# Patient Record
Sex: FEMALE | Race: BLACK OR AFRICAN AMERICAN | Employment: OTHER | URBAN - METROPOLITAN AREA
[De-identification: names, ages, dates, MRNs, and addresses within clinical notes are randomized per-mention and may not be internally consistent; named-entity substitution may affect disease eponyms.]

---

## 2017-01-19 ENCOUNTER — OP HISTORICAL/CONVERTED ENCOUNTER (OUTPATIENT)
Dept: OTHER | Age: 77
End: 2017-01-19

## 2017-02-02 ENCOUNTER — OP HISTORICAL/CONVERTED ENCOUNTER (OUTPATIENT)
Dept: OTHER | Age: 77
End: 2017-02-02

## 2017-02-06 ENCOUNTER — OP HISTORICAL/CONVERTED ENCOUNTER (OUTPATIENT)
Dept: OTHER | Age: 77
End: 2017-02-06

## 2017-03-15 RX ORDER — METHIMAZOLE 5 MG/1
TABLET ORAL
Qty: 69 TAB | Refills: 1 | Status: SHIPPED | OUTPATIENT
Start: 2017-03-15 | End: 2017-09-20 | Stop reason: SDUPTHER

## 2017-04-21 ENCOUNTER — OP HISTORICAL/CONVERTED ENCOUNTER (OUTPATIENT)
Dept: OTHER | Age: 77
End: 2017-04-21

## 2017-09-07 ENCOUNTER — TELEPHONE (OUTPATIENT)
Dept: ENDOCRINOLOGY | Age: 77
End: 2017-09-07

## 2017-09-07 DIAGNOSIS — E05.20 TOXIC MULTINODULAR GOITER: Primary | ICD-10-CM

## 2017-09-19 LAB
25(OH)D3+25(OH)D2 SERPL-MCNC: 28.5 NG/ML (ref 30–100)
T4 FREE SERPL-MCNC: 1 NG/DL (ref 0.82–1.77)
TSH SERPL DL<=0.005 MIU/L-ACNC: 1.32 UIU/ML (ref 0.45–4.5)

## 2017-09-20 RX ORDER — METHIMAZOLE 5 MG/1
TABLET ORAL
Qty: 69 TAB | Refills: 1 | Status: SHIPPED | OUTPATIENT
Start: 2017-09-20 | End: 2017-10-02 | Stop reason: SDUPTHER

## 2017-10-02 ENCOUNTER — OFFICE VISIT (OUTPATIENT)
Dept: ENDOCRINOLOGY | Age: 77
End: 2017-10-02

## 2017-10-02 VITALS
TEMPERATURE: 97.8 F | DIASTOLIC BLOOD PRESSURE: 74 MMHG | SYSTOLIC BLOOD PRESSURE: 124 MMHG | HEIGHT: 63 IN | BODY MASS INDEX: 32.25 KG/M2 | WEIGHT: 182 LBS | RESPIRATION RATE: 14 BRPM | HEART RATE: 68 BPM

## 2017-10-02 DIAGNOSIS — E04.2 MULTINODULAR NON-TOXIC GOITER: Primary | ICD-10-CM

## 2017-10-02 DIAGNOSIS — E05.90 SUBCLINICAL HYPERTHYROIDISM: ICD-10-CM

## 2017-10-02 DIAGNOSIS — M81.0 OSTEOPOROSIS, POST-MENOPAUSAL: ICD-10-CM

## 2017-10-02 RX ORDER — METHIMAZOLE 5 MG/1
TABLET ORAL
Qty: 69 TAB | Refills: 4 | Status: SHIPPED | OUTPATIENT
Start: 2017-10-02 | End: 2018-12-05 | Stop reason: SDUPTHER

## 2017-10-02 NOTE — PROGRESS NOTES
Wt Readings from Last 3 Encounters:   10/02/17 182 lb (82.6 kg)   06/28/16 188 lb (85.3 kg)   01/27/16 186 lb (84.4 kg)     Temp Readings from Last 3 Encounters:   10/02/17 97.8 °F (36.6 °C) (Oral)   06/28/16 97.2 °F (36.2 °C) (Oral)   01/27/16 96.8 °F (36 °C) (Oral)     BP Readings from Last 3 Encounters:   10/02/17 124/74   06/28/16 114/64   01/27/16 112/65     Pulse Readings from Last 3 Encounters:   10/02/17 68   06/28/16 68   01/27/16 66     Order placed for pt,  per Verbal Order with read back from Dr Thania Dominguez on 10/2/2017.

## 2017-10-02 NOTE — MR AVS SNAPSHOT
Visit Information Date & Time Provider Department Dept. Phone Encounter #  
 10/2/2017  9:30 AM Sussy Renner MD [facility] [phone] [id number] Follow-up Instructions Return in about 1 year (around 10/2/2018). Upcoming Health Maintenance Date Due DTaP/Tdap/Td series (1 - Tdap) 4/3/1963 ZOSTER VACCINE AGE 60> 2/3/2002 GLAUCOMA SCREENING Q2Y 4/3/2007 Pneumococcal 65+ Low/Medium Risk (1 of 2 - PCV13) 4/3/2007 MEDICARE YEARLY EXAM 4/3/2007 INFLUENZA AGE 9 TO ADULT 8/1/2017 Allergies as of 10/2/2017  Review Complete On: 10/2/2017 By: Kathleen Diana LPN Severity Noted Reaction Type Reactions Codeine  07/07/2010    Unknown (comments) Current Immunizations  Reviewed on 7/7/2010 No immunizations on file. Not reviewed this visit You Were Diagnosed With   
  
 Codes Comments Multinodular non-toxic goiter    -  Primary ICD-10-CM: E04.2 ICD-9-CM: 241.1 Osteoporosis, post-menopausal     ICD-10-CM: M81.0 ICD-9-CM: 733.01 Subclinical hyperthyroidism     ICD-10-CM: E05.90 ICD-9-CM: 242.90 Vitals BP Pulse Temp Resp Height(growth percentile) Weight(growth percentile) 124/74 (BP 1 Location: Left arm, BP Patient Position: Sitting) 68 97.8 °F (36.6 °C) (Oral) 14 5' 3\" (1.6 m) 182 lb (82.6 kg) BMI OB Status Smoking Status 32.24 kg/m2 Postmenopausal Never Smoker Vitals History BMI and BSA Data Body Mass Index Body Surface Area  
 32.24 kg/m 2 1.92 m 2 Preferred Pharmacy Pharmacy Name Phone [address] [facility] [phone] Your Updated Medication List  
  
   
This list is accurate as of: 10/2/17  9:44 AM.  Always use your most recent med list.  
  
  
  
  
 COZAAR 50 mg tablet Generic drug:  losartan Take 25 mg by mouth daily. methIMAzole 5 mg tablet Commonly known as:  TAPAZOLE  
 TAKE 1 TABLET ON ALTERNATE DAYS OF ONE-HALF (1/2) TABLET  
  
 PREMARIN 0.625 mg/gram vaginal cream  
Generic drug:  conjugated estrogens Insert 0.5 g into vagina daily. SINGULAIR 10 mg tablet Generic drug:  montelukast  
Take 10 mg by mouth daily. spironolactone 25 mg tablet Commonly known as:  ALDACTONE Take  by mouth daily. VITAMIN D3 2,000 unit Tab Generic drug:  cholecalciferol (vitamin D3) Take 1,000 Units by mouth daily. Prescriptions Sent to Pharmacy Refills  
 methIMAzole (TAPAZOLE) 5 mg tablet 4 Sig: TAKE 1 TABLET ON ALTERNATE DAYS OF ONE-HALF (1/2) TABLET Class: Normal  
 Pharmacy: [address], [address] [place] [address] #: [phone] Follow-up Instructions Return in about 1 year (around 10/2/2018). Patient Instructions   
 
 
 tapazole 5 mg and 2.5 mg alternately Start on Vit D  1200 it units a day Introducing [facility] SERVICES! [facility] introduces GVISP 1 patient portal. Now you can access parts of your medical record, email your doctor's office, and request medication refills online. 1. In your internet browser, go to [email or web address] 2. Click on the First Time User? Click Here link in the Sign In box. You will see the New Member Sign Up page. 3. Enter your GVISP 1 Access Code exactly as it appears below. You will not need to use this code after youve completed the sign-up process. If you do not sign up before the expiration date, you must request a new code. · GVISP 1 Access Code: [email or web address] Expires: 12/31/2017  9:44 AM 
 
4. Enter the last four digits of your Social Security Number (xxxx) and Date of Birth (mm/dd/yyyy) as indicated and click Submit. You will be taken to the next sign-up page. 5. Create a Infochimpst ID. This will be your Infochimpst login ID and cannot be changed, so think of one that is secure and easy to remember. 6. Create a Segmint password. You can change your password at any time. 7. Enter your Password Reset Question and Answer. This can be used at a later time if you forget your password. 8. Enter your e-mail address. You will receive e-mail notification when new information is available in [address] 9. Click Sign Up. You can now view and download portions of your medical record. 10. Click the Download Summary menu link to download a portable copy of your medical information. If you have questions, please visit the Frequently Asked Questions section of the Segmint website. Remember, Segmint is NOT to be used for urgent needs. For medical emergencies, dial 911. Now available from your iPhone and Android! Please provide this summary of care documentation to your next provider. Your primary care clinician is listed as [facility]. If you have any questions after today's visit, please call [phone].

## 2017-10-02 NOTE — PROGRESS NOTES
HISTORY OF PRESENT ILLNESS   Roxanne Conteh is a 78 y.o. female. 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 HENT: Negative. Eyes: Negative for pain and redness. Respiratory: Negative. Cardiovascular: Negative for chest pain, palpitations and leg swelling. Gastrointestinal: Negative. Negative for constipation. Genitourinary: Negative. Musculoskeletal: Negative for myalgias. Skin: Negative. Neurological: Negative. Endo/Heme/Allergies: Negative. Psychiatric/Behavioral: Negative for depression and memory loss. The patient does not have insomnia. Physical Exam   Constitutional: She is oriented to person, place, and time. She appears well-developed and well-nourished. HENT:   Head: Normocephalic. Eyes: Conjunctivae and extraocular motions are normal. Pupils are equal, round, and reactive to light. Neck: Normal range of motion. Neck supple. No JVD present. No tracheal deviation present. Thyromegaly (2-3 times enlarged nodular bilaterally) present. Cardiovascular: Normal rate, regular rhythm and normal heart sounds. No murmur heard. Pulmonary/Chest: Breath sounds normal.   Abdominal: Soft. Bowel sounds are normal.   Musculoskeletal: Normal range of motion. Lymphadenopathy:   She has no cervical adenopathy. Neurological: She is alert and oriented to person, place, and time. She has normal reflexes. Skin: Skin is warm. Psychiatric: She has a normal mood and affect. Lab Results   Component Value Date/Time    TSH 1.320 09/18/2017 08:46 AM    T4, Free 1.00 09/18/2017 08:46 AM          ASSESSMENT and PLAN       1.  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 TSH is 4.4 - pcp labs scanned from 4/14/2014    Started on  Tapazole  5 mg and 2.5 mg from April 2014   And she is euthyrodi now on this dose         2. Bilateral dominant nodules: 2.7 cm on right and on left side 1.6 cm from usg 4/29/2011 . Bilateral biopsies were done in dec 2011 and were negative for cancer     usg - oct 2013  Showed right , left and isthmus nodules to have shrunk in size   Had f/u usg thyroid jan 2016   Right thyroid lobe showed 4-5  Nodules ; Superior pole - hypoechoic,  Of 1.35 cm by 2.2 cm by 1.05 cm   At mid pole is 0.8 cm by 0.5 cm   At lower pole is 1.33 cm by 1.63 cm by 1.04 cm   A small one posterior to the inferior pole nodule    Left thyroid lobe showed 4 nodules of size 1.42 cm by 0.85 cm and another one of 1.4 cm   Isthmus has a nodule of 1.37 cm by 0.5 cm   Impression :  Many bilateral nodules, which have not grown in size much     She is asymptomatic       3.  Low BMD :   Date : Bone DEXA 3/24/2011 ap spine T-score -1.2; left femoral Neck T- score -1.0, right femoral neck T-score-1.5   On bisphonates oral fosamax for 11 years     She expressed concern for atypical fractures on fosamax   T 12 mild wedge deformity     Date : oct 2013   Bone DEXA  ap spine T-score -1.1; left femoral Neck T- score  -0.8, right femoral neck T-score -1.5  This one says that she did not have wedge fracture ( on hologic)  On premarin  Cream  for years since 2005  Date : dec 2015   Bone DEXA  ap spine T-score -1.7 ; left femoral Neck T- score  -1.1, right femoral neck T-score -1.2    She defers meds   She is aware of  estrogen adverse effects , but she is on it thru vaginal use    She is getting DEXa done in dec 2017 and will forward results to me   Discussed use of Bone markers   Stay on calcium and vit d            > 50 % of time is spent on counseling   Patient voiced understanding her plan of care

## 2018-04-24 ENCOUNTER — OP HISTORICAL/CONVERTED ENCOUNTER (OUTPATIENT)
Dept: OTHER | Age: 78
End: 2018-04-24

## 2018-09-25 LAB
25(OH)D3+25(OH)D2 SERPL-MCNC: 40.6 NG/ML (ref 30–100)
ALBUMIN SERPL-MCNC: 4.3 G/DL (ref 3.5–4.8)
ALBUMIN/GLOB SERPL: 1.5 {RATIO} (ref 1.2–2.2)
ALP SERPL-CCNC: 35 IU/L (ref 39–117)
ALT SERPL-CCNC: 16 IU/L (ref 0–32)
AST SERPL-CCNC: 17 IU/L (ref 0–40)
BILIRUB SERPL-MCNC: 0.4 MG/DL (ref 0–1.2)
BUN SERPL-MCNC: 20 MG/DL (ref 8–27)
BUN/CREAT SERPL: 22 (ref 12–28)
CALCIUM SERPL-MCNC: 9.8 MG/DL (ref 8.7–10.3)
CHLORIDE SERPL-SCNC: 101 MMOL/L (ref 96–106)
CO2 SERPL-SCNC: 25 MMOL/L (ref 20–29)
CREAT SERPL-MCNC: 0.89 MG/DL (ref 0.57–1)
GLOBULIN SER CALC-MCNC: 2.9 G/DL (ref 1.5–4.5)
GLUCOSE SERPL-MCNC: 102 MG/DL (ref 65–99)
POTASSIUM SERPL-SCNC: 4.4 MMOL/L (ref 3.5–5.2)
PROT SERPL-MCNC: 7.2 G/DL (ref 6–8.5)
SODIUM SERPL-SCNC: 140 MMOL/L (ref 134–144)
T4 FREE SERPL-MCNC: 1.1 NG/DL (ref 0.82–1.77)
TSH SERPL DL<=0.005 MIU/L-ACNC: 1.71 UIU/ML (ref 0.45–4.5)

## 2018-10-01 ENCOUNTER — OFFICE VISIT (OUTPATIENT)
Dept: ENDOCRINOLOGY | Age: 78
End: 2018-10-01

## 2018-10-01 VITALS
DIASTOLIC BLOOD PRESSURE: 76 MMHG | HEIGHT: 63 IN | SYSTOLIC BLOOD PRESSURE: 135 MMHG | WEIGHT: 179 LBS | TEMPERATURE: 97.5 F | OXYGEN SATURATION: 99 % | RESPIRATION RATE: 18 BRPM | HEART RATE: 73 BPM | BODY MASS INDEX: 31.71 KG/M2

## 2018-10-01 DIAGNOSIS — E05.90 HYPERTHYROIDISM: Chronic | ICD-10-CM

## 2018-10-01 DIAGNOSIS — E04.2 MULTINODULAR GOITER: Primary | ICD-10-CM

## 2018-10-01 NOTE — PROGRESS NOTES
HISTORY OF PRESENT ILLNESS Mark Anthony Duncan is a 70 y.o. female. HPI  
F/u for toxic MNG and osteoporosis after October 2017 Pt has pain in right side of the neck On occasions she feels dryness in the throat She wants to make sure thyroid is fine She denies any hyper or hypo thyrodi symptoms Old history Gained 2 lbs She is compliant with Tapazole 5 mg and 2.5 mg alternately Denies any hyper or hypothyroid symptoms Denies any growth of goiter either Has occasional swallowing issues She had labs outside No interim hospitalizations Review of Systems Constitutional: Negative. HENT: Negative. Eyes: Negative for pain and redness. Respiratory: Negative. Cardiovascular: Negative for chest pain, palpitations and leg swelling. Gastrointestinal: Negative. Negative for constipation. Genitourinary: Negative. Musculoskeletal: Negative for myalgias. Skin: Negative. Neurological: Negative. Endo/Heme/Allergies: Negative. Psychiatric/Behavioral: Negative for depression and memory loss. The patient does not have insomnia. Physical Exam  
Constitutional: She is oriented to person, place, and time. She appears well-developed and well-nourished. HENT:  
Head: Normocephalic. Eyes: Conjunctivae and extraocular motions are normal. Pupils are equal, round, and reactive to light. Neck: Normal range of motion. Neck supple. No JVD present. No tracheal deviation present. Thyromegaly (2-3 times enlarged nodular bilaterally) present. Cardiovascular: Normal rate, regular rhythm and normal heart sounds. No murmur heard. Pulmonary/Chest: Breath sounds normal.  
Abdominal: Soft. Bowel sounds are normal.  
Musculoskeletal: Normal range of motion. Lymphadenopathy:  
She has no cervical adenopathy. Neurological: She is alert and oriented to person, place, and time. She has normal reflexes. Skin: Skin is warm. Psychiatric: She has a normal mood and affect. Lab Results Component Value Date/Time TSH 1.710 09/24/2018 08:33 AM  
 T4, Free 1.10 09/24/2018 08:33 AM  
  
 
 
ASSESSMENT and PLAN 1. 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 TSH is 4.4 - pcp labs scanned from 4/14/2014 Started on  Tapazole  5 mg and 2.5 mg from April 2014 And she is euthyrodi now on this dose 2. Bilateral dominant nodules: 2.7 cm on right and on left side 1.6 cm from usg 4/29/2011 . Bilateral biopsies were done in dec 2011 and were negative for cancer  
 
usg - oct 2013  Showed right , left and isthmus nodules to have shrunk in size Had f/u usg thyroid jan 2016 Right thyroid lobe showed 4-5  Nodules ; Superior pole - hypoechoic,  Of 1.35 cm by 2.2 cm by 1.05 cm At mid pole is 0.8 cm by 0.5 cm At lower pole is 1.33 cm by 1.63 cm by 1.04 cm A small one posterior to the inferior pole nodule Left thyroid lobe showed 4 nodules of size 1.42 cm by 0.85 cm and another one of 1.4 cm Isthmus has a nodule of 1.37 cm by 0.5 cm Impression : 
Many bilateral nodules, which have not grown in size much 1.3 cm and 1.4 cm   on the ultrasound done in 2016 She is minimally symptomatic this visit October 1, 2018 and I have asked her to get an ultrasound done outside the office. She understands and is ready to get the old biopsy done if needed She does not like to take local anesthesia 3. Low BMD :  
Date : Bone DEXA 3/24/2011 ap spine T-score -1.2; left femoral Neck T- score -1.0, right femoral neck T-score-1.5 On bisphonates oral fosamax for 11 years She expressed concern for atypical fractures on fosamax T 12 mild wedge deformity Date : oct 2013   Bone DEXA  ap spine T-score -1.1; left femoral Neck T- score  -0.8, right femoral neck T-score -1.5 This one says that she did not have wedge fracture ( on hologic) On premarin  Cream  for years since 2005 Date : dec 2015   Bone DEXA  ap spine T-score -1.7 ; left femoral Neck T- score  -1.1, right femoral neck T-score -1.2 She defers meds She is aware of  estrogen adverse effects , but she is on it thru vaginal use Stay on calcium and vit d  
 
 
  
 
> 50 % of time is spent on counseling Patient voiced understanding her plan of care

## 2018-10-01 NOTE — PROGRESS NOTES
1. Have you been to the ER, urgent care clinic since your last visit? No  Hospitalized since your last visit? No 
 
2. Have you seen or consulted any other health care providers outside of the [address] [place] [address] since your last visit? No 
 
Wt Readings from Last 3 Encounters:  
10/01/18 179 lb (81.2 kg) 10/02/17 182 lb (82.6 kg) 06/28/16 188 lb (85.3 kg) Temp Readings from Last 3 Encounters:  
10/01/18 97.5 °F (36.4 °C) (Oral) 10/02/17 97.8 °F (36.6 °C) (Oral) 06/28/16 97.2 °F (36.2 °C) (Oral) BP Readings from Last 3 Encounters:  
10/01/18 135/76  
10/02/17 124/74  
06/28/16 114/64 Pulse Readings from Last 3 Encounters:  
10/01/18 73  
10/02/17 68  
06/28/16 68

## 2018-10-01 NOTE — MR AVS SNAPSHOT
[address] [facility] [address] 
[phone] Patient: Heriberto Bojorquez MRN: [id number] NASIM:[birth year] Visit Information Date & Time Provider Department Dept. Phone Encounter #  
 10/1/2018  9:00 AM Nicolasa Boles MD [facility] Endocr[facility]ology [phone] [id number] Follow-up Instructions Return in about 1 year (around 10/1/2019). Upcoming Health Maintenance Date Due DTaP/Tdap/Td series (1 - Tdap) 4/3/1963 Shingrix Vaccine Age 50> (1 of 2) 4/3/1992 GLAUCOMA SCREENING Q2Y 4/3/2007 Pneumococcal 65+ Low/Medium Risk (1 of 2 - PCV13) 4/3/2007 Influenza Age 5 to Adult 8/1/2018 Allergies as of 10/1/2018  Review Complete On: 10/1/2018 By: Nicolasa Boles MD  
  
 Severity Noted Reaction Type Reactions Codeine  07/07/2010    Unknown (comments) Current Immunizations  Reviewed on 7/7/2010 No immunizations on file. Not reviewed this visit You Were Diagnosed With   
  
 Codes Comments Multinodular goiter    -  Primary ICD-10-CM: E04.2 ICD-9-CM: 513. 1 Vitals BP Pulse Temp Resp Height(growth percentile) Weight(growth percentile) 135/76 (BP 1 Location: Right arm, BP Patient Position: Sitting) 73 97.5 °F (36.4 °C) (Oral) 18 5' 3\" (1.6 m) 179 lb (81.2 kg) SpO2 BMI OB Status Smoking Status 99% 31.71 kg/m2 Postmenopausal Never Smoker BMI and BSA Data Body Mass Index Body Surface Area 31.71 kg/m 2 1.9 m 2 Preferred Pharmacy Pharmacy Name Phone [facility] [phone] Your Updated Medication List  
  
   
This list is accurate as of 10/1/18  9:41 AM.  Always use your most recent med list.  
  
  
  
  
 CALCIUM 600 PO Take 600 mg by mouth two (2) times a day. COZAAR 50 mg tablet Generic drug:  losartan Take 25 mg by mouth daily. methIMAzole 5 mg tablet Commonly known as:  TAPAZOLE  
TAKE 1 TABLET ON ALTERNATE DAYS OF ONE-HALF (1/2) TABLET  
  
 PREMARIN 0.625 mg/gram vaginal cream  
Generic drug:  conjugated estrogens Insert 0.5 g into vagina daily. SINGULAIR 10 mg tablet Generic drug:  montelukast  
Take 10 mg by mouth daily. spironolactone 25 mg tablet Commonly known as:  ALDACTONE Take  by mouth daily. VITAMIN D3 2,000 unit Tab Generic drug:  cholecalciferol (vitamin D3) Take 1,000 Units by mouth daily. Follow-up Instructions Return in about 1 year (around 10/1/2019). To-Do List   
 10/01/2018 Imaging:  US THYROID/PARATHYROID/SOFT TISS Patient Instructions   
-------------------------------------------------------------------------------------------- Refills    -    please call your pharmacy and have them send us a refill request 
 
Results  -  allow up to a week for lab results to be processed and reviewed. Phone calls  -  Allow upto 24 hrs. for non-urgent calls to be retained Prior authorization - It may take up to 4 weeks to process, depending on your insurance Forms  -  FMLA, DMV, patient assistance, etc. will take up to 2 weeks to process Cancellations - please notify the office in advance if you cannot keep your appointment Samples  - will only be dispensed at visits as supply is limited If you are having a medical emergency call 911 
 
-------------------------------------------------------------------------------------------- 
 
 
 tapazole 5 mg and 2.5 mg alternately Stay on  Vit D  1200 it units a day Introducing [facility] SERVICES! [facility] introduces Kaleio patient portal. Now you can access parts of your medical record, email your doctor's office, and request medication refills online. 1. In your internet browser, go to [email or web address] 2. Click on the First Time User? Click Here link in the Sign In box.  You will see the New Member Sign Up page. 3. Enter your Interact Public Safety Access Code exactly as it appears below. You will not need to use this code after youve completed the sign-up process. If you do not sign up before the expiration date, you must request a new code. · Interact Public [employer] Access Code: [email or web address] Expires: 12/30/2018  9:36 AM 
 
4. Enter the last four digits of your Social Security Number (xxxx) and Date of Birth (mm/dd/yyyy) as indicated and click Submit. You will be taken to the next sign-up page. 5. Create a Interact Public Safety ID. This will be your Interact Public Safety login ID and cannot be changed, so think of one that is secure and easy to remember. 6. Create a Interact Public Safety password. You can change your password at any time. 7. Enter your Password Reset Question and Answer. This can be used at a later time if you forget your password. 8. Enter your e-mail address. You will receive e-mail notification when new information is available in [address] 9. Click Sign Up. You can now view and download portions of your medical record. 10. Click the Download Summary menu link to download a portable copy of your medical information. If you have questions, please visit the Frequently Asked Questions section of the Interact Public Safety website. Remember, Interact Public [employer] is NOT to be used for urgent needs. For medical emergencies, dial 911. Now available from your iPhone and Android! Please provide this summary of care documentation to your next provider. Your primary care clinician is listed as [facility]. If you have any questions after today's visit, please call [phone].

## 2018-10-07 ENCOUNTER — DOCUMENTATION ONLY (OUTPATIENT)
Dept: ENDOCRINOLOGY | Age: 78
End: 2018-10-07

## 2018-10-08 ENCOUNTER — OP HISTORICAL/CONVERTED ENCOUNTER (OUTPATIENT)
Dept: OTHER | Age: 78
End: 2018-10-08

## 2018-10-08 NOTE — PROGRESS NOTES
Reviewed the thyroid ultrasound from date October 3, 2018  Multiple mixed echoic thyroid nodules are noticed  Largest on the right thyroid lobe measuring 2.1 x 1.9 x 1.5 cm  On the on the left side the nodule is 1.3 x 0.9 x 1.1 cm  There is no increased internal vascularity      Inform patient that on my ultrasound which was done in the office I may have commented 2 separate nodules on the right side but they may have gotten measured together on the current ultrasound    I highly doubt that the nodules have gotten any bigger  I have not recommending a biopsy on her unless she has increasing symptoms and she wants to get it checked out I am open to do the biopsy in the office

## 2018-10-15 DIAGNOSIS — E04.2 MULTINODULAR GOITER: ICD-10-CM

## 2018-10-15 DIAGNOSIS — E05.90 HYPERTHYROIDISM: Chronic | ICD-10-CM

## 2018-12-05 DIAGNOSIS — M81.0 OSTEOPOROSIS, POST-MENOPAUSAL: ICD-10-CM

## 2018-12-05 DIAGNOSIS — E05.90 SUBCLINICAL HYPERTHYROIDISM: ICD-10-CM

## 2018-12-05 DIAGNOSIS — E04.2 MULTINODULAR NON-TOXIC GOITER: ICD-10-CM

## 2018-12-05 RX ORDER — METHIMAZOLE 5 MG/1
TABLET ORAL
Qty: 69 TAB | Refills: 4 | Status: SHIPPED | OUTPATIENT
Start: 2018-12-05 | End: 2019-10-28 | Stop reason: SDUPTHER

## 2019-04-29 ENCOUNTER — OP HISTORICAL/CONVERTED ENCOUNTER (OUTPATIENT)
Dept: OTHER | Age: 79
End: 2019-04-29

## 2019-10-25 ENCOUNTER — TELEPHONE (OUTPATIENT)
Dept: ENDOCRINOLOGY | Age: 79
End: 2019-10-25

## 2019-10-25 DIAGNOSIS — E04.2 MULTINODULAR NON-TOXIC GOITER: Primary | ICD-10-CM

## 2019-10-26 LAB
T4 FREE SERPL-MCNC: 1.1 NG/DL (ref 0.82–1.77)
TSH SERPL DL<=0.005 MIU/L-ACNC: 1.62 UIU/ML (ref 0.45–4.5)

## 2019-10-28 ENCOUNTER — OFFICE VISIT (OUTPATIENT)
Dept: ENDOCRINOLOGY | Age: 79
End: 2019-10-28

## 2019-10-28 VITALS
DIASTOLIC BLOOD PRESSURE: 71 MMHG | OXYGEN SATURATION: 96 % | HEIGHT: 63 IN | WEIGHT: 168.3 LBS | BODY MASS INDEX: 29.82 KG/M2 | TEMPERATURE: 97 F | RESPIRATION RATE: 18 BRPM | SYSTOLIC BLOOD PRESSURE: 119 MMHG | HEART RATE: 65 BPM

## 2019-10-28 DIAGNOSIS — E05.90 SUBCLINICAL HYPERTHYROIDISM: Primary | ICD-10-CM

## 2019-10-28 DIAGNOSIS — M81.0 OSTEOPOROSIS, POST-MENOPAUSAL: ICD-10-CM

## 2019-10-28 DIAGNOSIS — E04.2 MULTINODULAR NON-TOXIC GOITER: ICD-10-CM

## 2019-10-28 DIAGNOSIS — E05.90 SUBCLINICAL HYPERTHYROIDISM: ICD-10-CM

## 2019-10-28 RX ORDER — METHIMAZOLE 5 MG/1
TABLET ORAL
Qty: 69 TAB | Refills: 4 | Status: SHIPPED | OUTPATIENT
Start: 2019-10-28 | End: 2020-11-09 | Stop reason: SDUPTHER

## 2019-10-28 NOTE — PROGRESS NOTES
HISTORY OF PRESENT ILLNESS   Vincent Hsu is a 70 y.o. female. 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 after October 2018      Lost 9 lbs   She has been doing well           Old history     Pt has pain in right side of the neck  On occasions she feels dryness in the throat  She wants to make sure thyroid is fine   She denies any hyper or hypo thyrodi symptoms         Old history   Gained 2 lbs   She is compliant with Tapazole 5 mg and 2.5 mg alternately    Denies any hyper or hypothyroid symptoms   Denies any growth of goiter either    Has occasional swallowing issues    She had labs outside   No interim hospitalizations      Review of Systems   Constitutional: Negative. HENT: Negative. Eyes: Negative for pain and redness. Respiratory: Negative. Cardiovascular: Negative for chest pain, palpitations and leg swelling. Gastrointestinal: Negative. Negative for constipation. Genitourinary: Negative. Musculoskeletal: Negative for myalgias. Skin: Negative. Neurological: Negative. Endo/Heme/Allergies: Negative. Psychiatric/Behavioral: Negative for depression and memory loss. The patient does not have insomnia. Physical Exam   Constitutional: She is oriented to person, place, and time. She appears well-developed and well-nourished. HENT:   Head: Normocephalic. Eyes: Conjunctivae and extraocular motions are normal. Pupils are equal, round, and reactive to light. Neck: Normal range of motion. Neck supple. No JVD present. No tracheal deviation present. Thyromegaly (2-3 times enlarged nodular bilaterally) present. Cardiovascular: Normal rate, regular rhythm and normal heart sounds. No murmur heard. Pulmonary/Chest: Breath sounds normal.   Abdominal: Soft. Bowel sounds are normal.   Musculoskeletal: Normal range of motion. Lymphadenopathy:   She has no cervical adenopathy. Neurological: She is alert and oriented to person, place, and time. She has normal reflexes. Skin: Skin is warm. Psychiatric: She has a normal mood and affect. Lab Results   Component Value Date/Time    TSH 1.620 10/25/2019 11:32 AM    T4, Free 1.10 10/25/2019 11:32 AM          ASSESSMENT and PLAN       1. 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 TSH is 4.4 - pcp labs scanned from 4/14/2014    Started on  Tapazole  5 mg and 2.5 mg from April 2014   And she is euthyrodi now on this dose  Oct 2019 - doing well on the same dose          2. Bilateral dominant nodules: 2.7 cm on right and on left side 1.6 cm from usg 4/29/2011 . Bilateral biopsies were done in dec 2011 and were negative for cancer     usg - oct 2013  Showed right , left and isthmus nodules to have shrunk in size   Had f/u usg thyroid jan 2016   Right thyroid lobe showed 4-5  Nodules ; Superior pole - hypoechoic,  Of 1.35 cm by 2.2 cm by 1.05 cm   At mid pole is 0.8 cm by 0.5 cm   At lower pole is 1.33 cm by 1.63 cm by 1.04 cm   A small one posterior to the inferior pole nodule    Left thyroid lobe showed 4 nodules of size 1.42 cm by 0.85 cm and another one of 1.4 cm   Isthmus has a nodule of 1.37 cm by 0.5 cm   Impression :  Many bilateral nodules, which have not grown in size much 1.3 cm and 1.4 cm   on the ultrasound done in 2016    She is minimally symptomatic this visit October 1, 2018 and I have asked her to get an ultrasound done outside the office. She understands and is ready to get the old biopsy done if needed  She does not like to take local anesthesia      3.  Low BMD :   Date : Bone DEXA 3/24/2011 ap spine T-score -1.2; left femoral Neck T- score -1.0, right femoral neck T-score-1.5   On bisphonates oral fosamax for 11 years   AND STOPPED 5 YEARS AGO 2015     She expressed concern for atypical fractures on fosamax   T 12 mild wedge deformity     Date : oct 2013   Bone DEXA  ap spine T-score -1.1; left femoral Neck T- score  -0.8, right femoral neck T-score -1.5  This one says that she did not have wedge fracture ( on hologic)  On premarin  Cream  for years since 2005  Date : dec 2015   Bone DEXA  ap spine T-score -1.7 ; left femoral Neck T- score  -1.1, right femoral neck T-score -1.2    DUE FOR DEXA   2017 , 2019      She defers meds   She is aware of  estrogen adverse effects , but she is on it thru vaginal use  Stay on calcium and vit d            > 50 % of time is spent on counseling   Patient voiced understanding her plan of care

## 2019-10-28 NOTE — PATIENT INSTRUCTIONS
-------------------------------------------------------------------------------------------- Refills    -    please call your pharmacy and have them send us a refill request 
 
Results  -  allow up to a week for lab results to be processed and reviewed. Phone calls  -  Allow upto 24 hrs. for non-urgent calls to be retained Prior authorization - It may take up to 4 weeks to process, depending on your insurance Forms  -  FMLA, DMV, patient assistance, etc. will take up to 2 weeks to process Cancellations - please notify the office in advance if you cannot keep your appointment Samples  - will only be dispensed at visits as supply is limited If you are having a medical emergency call 911 
 
-------------------------------------------------------------------------------------------- 
 
 
 tapazole 5 mg and 2.5 mg alternately Stay on  Vit D  1200 it units a day CALCIUM 600 MG BID

## 2019-10-28 NOTE — PROGRESS NOTES
Room 3    Identified pt with two pt identifiers(name and ). Reviewed record in preparation for visit and have obtained necessary documentation. All patient medications has been reviewed. Chief Complaint   Patient presents with    Thyroid Problem    Osteoporosis       Health Maintenance Due   Topic    DTaP/Tdap/Td series (1 - Tdap)    Shingrix Vaccine Age 49> (1 of 2)    GLAUCOMA SCREENING Q2Y     Pneumococcal 65+ years (1 of 2 - PCV13)    Influenza Age 5 to Adult        Vitals:    10/28/19 1132   BP: 119/71   Pulse: 65   Resp: 18   Temp: 97 °F (36.1 °C)   TempSrc: Oral   SpO2: 96%   Weight: 168 lb 4.8 oz (76.3 kg)   Height: 5' 3\" (1.6 m)   PainSc:   0 - No pain       Wt Readings from Last 3 Encounters:   10/28/19 168 lb 4.8 oz (76.3 kg)   10/01/18 179 lb (81.2 kg)   10/02/17 182 lb (82.6 kg)     Temp Readings from Last 3 Encounters:   10/28/19 97 °F (36.1 °C) (Oral)   10/01/18 97.5 °F (36.4 °C) (Oral)   10/02/17 97.8 °F (36.6 °C) (Oral)     BP Readings from Last 3 Encounters:   10/28/19 119/71   10/01/18 135/76   10/02/17 124/74     Pulse Readings from Last 3 Encounters:   10/28/19 65   10/01/18 73   10/02/17 68       No results found for: HBA1C, HGBE8, WOX6OYLT, IHH0HGUR, GUL6CHJQ    Coordination of Care Questionnaire:   1) Have you been to an emergency room, urgent care, or hospitalized since your last visit?   no       2. Have seen or consulted any other health care provider since your last visit? NO    3) Do you have an Advanced Directive/ Living Will in place? NO  If yes, do we have a copy on file NO  If no, would you like information NO    Patient is accompanied by self I have received verbal consent from Beti Jc to discuss any/all medical information while they are present in the room.

## 2019-12-10 ENCOUNTER — DOCUMENTATION ONLY (OUTPATIENT)
Dept: ENDOCRINOLOGY | Age: 79
End: 2019-12-10

## 2019-12-10 NOTE — PROGRESS NOTES
REVIEWED dexa  FROM   DEC 4 2019     Date : DEC 4 2019   Bone DEXA  ap spine T-score 0.1; left femoral Neck T- score -0.9, right femoral neck T-score -1.5    FRAX SCORE  : MAJOR FRACTURE IS 5.5 %  AND HIP IS 1.2 %        Inform pt that bone quality is good     Deniz Moreno MD

## 2019-12-23 DIAGNOSIS — E04.2 MULTINODULAR NON-TOXIC GOITER: ICD-10-CM

## 2019-12-23 DIAGNOSIS — E05.90 SUBCLINICAL HYPERTHYROIDISM: ICD-10-CM

## 2019-12-23 DIAGNOSIS — M81.0 OSTEOPOROSIS, POST-MENOPAUSAL: ICD-10-CM

## 2020-01-07 ENCOUNTER — HOSPITAL ENCOUNTER (OUTPATIENT)
Dept: CT IMAGING | Age: 80
Discharge: HOME | End: 2020-01-07
Attending: OPHTHALMOLOGY
Payer: MEDICARE

## 2020-01-07 DIAGNOSIS — H05.242 CONSTANT EXOPHTHALMOS, LEFT EYE: ICD-10-CM

## 2020-01-07 PROCEDURE — 74011636320 HC RX REV CODE- 636/320: Performed by: RADIOLOGY

## 2020-01-07 PROCEDURE — 70481 CT ORBIT/EAR/FOSSA W/DYE: CPT

## 2020-01-07 RX ADMIN — IOPAMIDOL 100 ML: 612 INJECTION, SOLUTION INTRAVENOUS at 07:51

## 2020-01-21 LAB — PAP SMEAR, EXTERNAL: NORMAL

## 2020-08-18 ENCOUNTER — OP HISTORICAL/CONVERTED ENCOUNTER (OUTPATIENT)
Dept: OTHER | Age: 80
End: 2020-08-18

## 2020-10-14 LAB
ALBUMIN SERPL-MCNC: 4.4 G/DL (ref 3.7–4.7)
ALBUMIN/GLOB SERPL: 1.3 {RATIO} (ref 1.2–2.2)
ALP SERPL-CCNC: 43 IU/L (ref 39–117)
ALT SERPL-CCNC: 14 IU/L (ref 0–32)
AST SERPL-CCNC: 16 IU/L (ref 0–40)
BASOPHILS # BLD AUTO: 0 X10E3/UL (ref 0–0.2)
BASOPHILS NFR BLD AUTO: 1 %
BILIRUB SERPL-MCNC: 0.6 MG/DL (ref 0–1.2)
BUN SERPL-MCNC: 14 MG/DL (ref 8–27)
BUN/CREAT SERPL: 17 (ref 12–28)
CALCIUM SERPL-MCNC: 9.8 MG/DL (ref 8.7–10.3)
CHLORIDE SERPL-SCNC: 103 MMOL/L (ref 96–106)
CO2 SERPL-SCNC: 26 MMOL/L (ref 20–29)
CREAT SERPL-MCNC: 0.83 MG/DL (ref 0.57–1)
EOSINOPHIL # BLD AUTO: 0.1 X10E3/UL (ref 0–0.4)
EOSINOPHIL NFR BLD AUTO: 1 %
ERYTHROCYTE [DISTWIDTH] IN BLOOD BY AUTOMATED COUNT: 13.2 % (ref 11.7–15.4)
GLOBULIN SER CALC-MCNC: 3.3 G/DL (ref 1.5–4.5)
GLUCOSE SERPL-MCNC: 91 MG/DL (ref 65–99)
HCT VFR BLD AUTO: 44 % (ref 34–46.6)
HGB BLD-MCNC: 14.5 G/DL (ref 11.1–15.9)
IMM GRANULOCYTES # BLD AUTO: 0 X10E3/UL (ref 0–0.1)
IMM GRANULOCYTES NFR BLD AUTO: 0 %
LYMPHOCYTES # BLD AUTO: 2.4 X10E3/UL (ref 0.7–3.1)
LYMPHOCYTES NFR BLD AUTO: 45 %
MCH RBC QN AUTO: 30.3 PG (ref 26.6–33)
MCHC RBC AUTO-ENTMCNC: 33 G/DL (ref 31.5–35.7)
MCV RBC AUTO: 92 FL (ref 79–97)
MONOCYTES # BLD AUTO: 0.4 X10E3/UL (ref 0.1–0.9)
MONOCYTES NFR BLD AUTO: 7 %
NEUTROPHILS # BLD AUTO: 2.5 X10E3/UL (ref 1.4–7)
NEUTROPHILS NFR BLD AUTO: 46 %
PLATELET # BLD AUTO: 236 X10E3/UL (ref 150–450)
POTASSIUM SERPL-SCNC: 4.3 MMOL/L (ref 3.5–5.2)
PROT SERPL-MCNC: 7.7 G/DL (ref 6–8.5)
RBC # BLD AUTO: 4.79 X10E6/UL (ref 3.77–5.28)
SODIUM SERPL-SCNC: 141 MMOL/L (ref 134–144)
T4 FREE SERPL-MCNC: 1.06 NG/DL (ref 0.82–1.77)
TSH SERPL DL<=0.005 MIU/L-ACNC: 3.53 UIU/ML (ref 0.45–4.5)
WBC # BLD AUTO: 5.4 X10E3/UL (ref 3.4–10.8)

## 2020-10-26 ENCOUNTER — OFFICE VISIT (OUTPATIENT)
Dept: ENDOCRINOLOGY | Age: 80
End: 2020-10-26
Payer: MEDICARE

## 2020-10-26 VITALS
WEIGHT: 170.8 LBS | HEIGHT: 63 IN | TEMPERATURE: 97.8 F | BODY MASS INDEX: 30.26 KG/M2 | RESPIRATION RATE: 18 BRPM | HEART RATE: 72 BPM | OXYGEN SATURATION: 98 % | DIASTOLIC BLOOD PRESSURE: 53 MMHG | SYSTOLIC BLOOD PRESSURE: 120 MMHG

## 2020-10-26 DIAGNOSIS — E05.20 TOXIC NODULAR GOITER: Primary | ICD-10-CM

## 2020-10-26 DIAGNOSIS — E05.90 SUBCLINICAL HYPERTHYROIDISM: ICD-10-CM

## 2020-10-26 DIAGNOSIS — E04.2 MULTINODULAR NON-TOXIC GOITER: ICD-10-CM

## 2020-10-26 PROCEDURE — 99214 OFFICE O/P EST MOD 30 MIN: CPT | Performed by: INTERNAL MEDICINE

## 2020-10-26 PROCEDURE — 1090F PRES/ABSN URINE INCON ASSESS: CPT | Performed by: INTERNAL MEDICINE

## 2020-10-26 PROCEDURE — G8427 DOCREV CUR MEDS BY ELIG CLIN: HCPCS | Performed by: INTERNAL MEDICINE

## 2020-10-26 PROCEDURE — 1101F PT FALLS ASSESS-DOCD LE1/YR: CPT | Performed by: INTERNAL MEDICINE

## 2020-10-26 PROCEDURE — G8752 SYS BP LESS 140: HCPCS | Performed by: INTERNAL MEDICINE

## 2020-10-26 PROCEDURE — G8754 DIAS BP LESS 90: HCPCS | Performed by: INTERNAL MEDICINE

## 2020-10-26 PROCEDURE — G8536 NO DOC ELDER MAL SCRN: HCPCS | Performed by: INTERNAL MEDICINE

## 2020-10-26 PROCEDURE — G8510 SCR DEP NEG, NO PLAN REQD: HCPCS | Performed by: INTERNAL MEDICINE

## 2020-10-26 PROCEDURE — G8417 CALC BMI ABV UP PARAM F/U: HCPCS | Performed by: INTERNAL MEDICINE

## 2020-10-26 NOTE — PROGRESS NOTES
1. Have you been to the ER, urgent care clinic since your last visit?no  Hospitalized since your last visit? No    2. Have you seen or consulted any other health care providers outside of the [address] [place] [address] since your last visit? Include any pap smears or colon screening.  No    Wt Readings from Last 3 Encounters:   10/26/20 170 lb 12.8 oz (77.5 kg)   10/28/19 168 lb 4.8 oz (76.3 kg)   10/01/18 179 lb (81.2 kg)     Temp Readings from Last 3 Encounters:   10/26/20 97.8 °F (36.6 °C) (Oral)   10/28/19 97 °F (36.1 °C) (Oral)   10/01/18 97.5 °F (36.4 °C) (Oral)     BP Readings from Last 3 Encounters:   10/26/20 (!) 120/53   10/28/19 119/71   10/01/18 135/76     Pulse Readings from Last 3 Encounters:   10/26/20 72   10/28/19 65   10/01/18 73

## 2020-10-26 NOTE — PROGRESS NOTES
HISTORY OF PRESENT ILLNESS   Daniella Lisa is a 68 y.o. female. 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 after October 2019      Gained 2  lbs   She has been doing well   No compressive symptoms           Old history     Pt has pain in right side of the neck  On occasions she feels dryness in the throat  She wants to make sure thyroid is fine   She denies any hyper or hypo thyrodi symptoms         Old history   Gained 2 lbs   She is compliant with Tapazole 5 mg and 2.5 mg alternately    Denies any hyper or hypothyroid symptoms   Denies any growth of goiter either    Has occasional swallowing issues    She had labs outside   No interim hospitalizations      Review of Systems   Constitutional: Negative. HENT: Negative. Eyes: Negative for pain and redness. Respiratory: Negative. Cardiovascular: Negative for chest pain, palpitations and leg swelling. Gastrointestinal: Negative. Negative for constipation. Genitourinary: Negative. Musculoskeletal: Negative for myalgias. Skin: Negative. Neurological: Negative. Endo/Heme/Allergies: Negative. Psychiatric/Behavioral: Negative for depression and memory loss. The patient does not have insomnia. Physical Exam   Constitutional: She is oriented to person, place, and time. She appears well-developed and well-nourished. HENT:   Head: Normocephalic. Eyes: Conjunctivae and extraocular motions are normal. Pupils are equal, round, and reactive to light. Neck: Normal range of motion. Neck supple. No JVD present. No tracheal deviation present. Thyromegaly (2-3 times enlarged nodular bilaterally) present. Cardiovascular: Normal rate, regular rhythm and normal heart sounds. No murmur heard. Pulmonary/Chest: Breath sounds normal.   Abdominal: Soft. Bowel sounds are normal.   Musculoskeletal: Normal range of motion. Lymphadenopathy:   She has no cervical adenopathy. Neurological: She is alert and oriented to person, place, and time. She has normal reflexes. Skin: Skin is warm. Psychiatric: She has a normal mood and affect. Lab Results   Component Value Date/Time    TSH 3.530 10/13/2020 02:33 PM    T4, Free 1.06 10/13/2020 02:33 PM          ASSESSMENT and PLAN       1. 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 TSH is 4.4 - pcp labs scanned from 4/14/2014    Started on  Tapazole  5 mg and 2.5 mg from April 2014   And she is euthyrodi now on this dose  Oct 2019 - doing well on the same dose       Oct 2020  -  TAPAZole   5 mg and 2.5   Alternate days, and this dose for   6  Years          2. Bilateral dominant nodules: 2.7 cm on right and on left side 1.6 cm from usg 4/29/2011 . Bilateral biopsies were done in dec 2011 and were negative for cancer     usg - oct 2013  Showed right , left and isthmus nodules to have shrunk in size   Had f/u usg thyroid jan 2016   Right thyroid lobe showed 4-5  Nodules ; Superior pole - hypoechoic,  Of 1.35 cm by 2.2 cm by 1.05 cm   At mid pole is 0.8 cm by 0.5 cm   At lower pole is 1.33 cm by 1.63 cm by 1.04 cm   A small one posterior to the inferior pole nodule    Left thyroid lobe showed 4 nodules of size 1.42 cm by 0.85 cm and another one of 1.4 cm   Isthmus has a nodule of 1.37 cm by 0.5 cm   Impression :  Many bilateral nodules, which have not grown in size much 1.3 cm and 1.4 cm   on the ultrasound done in 2016    She is minimally symptomatic this visit October 1, 2018 and I have asked her to get an ultrasound done outside the office. She understands and is ready to get the old biopsy done if needed  She does not like to take local anesthesia      Oct 2020 :  [address] 2018  : left side 1.3 cm  And right side 2.1 cm ( noticing slight growth of nodule on right side )   Ordered [facility]g for next year at [facility]  And will follow up on that            3.  Low BMD :   Date : Bone DEXA 3/24/2011 ap spine T-score -1.2; left femoral Neck T- score -1.0, right femoral neck T-score-1.5   On bisphonates oral fosamax for 11 years   AND STOPPED 5 YEARS AGO 2015     She expressed concern for atypical fractures on fosamax   T 12 mild wedge deformity     Date : oct 2013   Bone DEXA  ap spine T-score -1.1; left femoral Neck T- score  -0.8, right femoral neck T-score -1.5  This one says that she did not have wedge fracture ( on hologic)  On premarin  Cream  for years since 2005  Date : dec 2015   Bone DEXA  ap spine T-score -1.7 ; left femoral Neck T- score  -1.1, right femoral neck T-score -1.2    DUE FOR DEXA   2017 ,  2019      She defers meds   She is aware of  estrogen adverse effects , but she is on it thru vaginal use  Stay on calcium and vit d            > 50 % of time is spent on counseling   Patient voiced understanding her plan of care

## 2020-10-26 NOTE — LETTER
10/26/20 Patient: Amelia Arriaga YOB: 1940 Date of Visit: 10/26/2020 Sergei Funez MD 
[address] [facility] [address] VIA In Basket Dear Sergei Funez MD, Thank you for referring Ms. Emily Hayward to [address] [address] for evaluation. My notes for this consultation are attached. If you have questions, please do not hesitate to call me. I look forward to following your patient along with you. Sincerely, Ruben Rain MD

## 2020-11-09 DIAGNOSIS — E05.90 SUBCLINICAL HYPERTHYROIDISM: ICD-10-CM

## 2020-11-09 DIAGNOSIS — E04.2 MULTINODULAR NON-TOXIC GOITER: ICD-10-CM

## 2020-11-09 DIAGNOSIS — M81.0 OSTEOPOROSIS, POST-MENOPAUSAL: ICD-10-CM

## 2020-11-09 RX ORDER — METHIMAZOLE 5 MG/1
TABLET ORAL
Qty: 75 TAB | Refills: 3 | Status: SHIPPED | OUTPATIENT
Start: 2020-11-09 | End: 2021-10-25

## 2020-11-25 VITALS
BODY MASS INDEX: 30.12 KG/M2 | DIASTOLIC BLOOD PRESSURE: 74 MMHG | HEIGHT: 63 IN | SYSTOLIC BLOOD PRESSURE: 116 MMHG | WEIGHT: 170 LBS

## 2021-09-14 ENCOUNTER — TRANSCRIBE ORDER (OUTPATIENT)
Dept: SCHEDULING | Age: 81
End: 2021-09-14

## 2021-09-14 DIAGNOSIS — Z12.31 VISIT FOR SCREENING MAMMOGRAM: Primary | ICD-10-CM

## 2021-09-28 ENCOUNTER — TRANSCRIBE ORDER (OUTPATIENT)
Dept: SCHEDULING | Age: 81
End: 2021-09-28

## 2021-09-28 ENCOUNTER — HOSPITAL ENCOUNTER (OUTPATIENT)
Dept: MAMMOGRAPHY | Age: 81
Discharge: HOME | End: 2021-09-28
Attending: INTERNAL MEDICINE
Payer: MEDICARE

## 2021-09-28 DIAGNOSIS — Z12.31 VISIT FOR SCREENING MAMMOGRAM: ICD-10-CM

## 2021-09-28 DIAGNOSIS — Z78.0 POSTMENOPAUSAL: Primary | ICD-10-CM

## 2021-09-28 PROCEDURE — 77063 BREAST TOMOSYNTHESIS BI: CPT

## 2021-10-24 DIAGNOSIS — M81.0 OSTEOPOROSIS, POST-MENOPAUSAL: ICD-10-CM

## 2021-10-24 DIAGNOSIS — E05.90 SUBCLINICAL HYPERTHYROIDISM: ICD-10-CM

## 2021-10-24 DIAGNOSIS — E04.2 MULTINODULAR NON-TOXIC GOITER: ICD-10-CM

## 2021-10-25 RX ORDER — METHIMAZOLE 5 MG/1
TABLET ORAL
Qty: 68 TABLET | Refills: 3 | Status: SHIPPED | OUTPATIENT
Start: 2021-10-25 | End: 2022-10-27 | Stop reason: SDUPTHER

## 2021-10-27 ENCOUNTER — OFFICE VISIT (OUTPATIENT)
Dept: ENDOCRINOLOGY | Age: 81
End: 2021-10-27
Payer: MEDICARE

## 2021-10-27 VITALS
WEIGHT: 172.8 LBS | RESPIRATION RATE: 18 BRPM | BODY MASS INDEX: 30.62 KG/M2 | TEMPERATURE: 97.3 F | SYSTOLIC BLOOD PRESSURE: 116 MMHG | HEIGHT: 63 IN | DIASTOLIC BLOOD PRESSURE: 63 MMHG | OXYGEN SATURATION: 98 % | HEART RATE: 52 BPM

## 2021-10-27 DIAGNOSIS — E05.20 TOXIC NODULAR GOITER: Primary | ICD-10-CM

## 2021-10-27 DIAGNOSIS — M81.0 OSTEOPOROSIS, POST-MENOPAUSAL: ICD-10-CM

## 2021-10-27 DIAGNOSIS — E05.90 SUBCLINICAL HYPERTHYROIDISM: ICD-10-CM

## 2021-10-27 PROCEDURE — G8427 DOCREV CUR MEDS BY ELIG CLIN: HCPCS | Performed by: INTERNAL MEDICINE

## 2021-10-27 PROCEDURE — 1090F PRES/ABSN URINE INCON ASSESS: CPT | Performed by: INTERNAL MEDICINE

## 2021-10-27 PROCEDURE — G8752 SYS BP LESS 140: HCPCS | Performed by: INTERNAL MEDICINE

## 2021-10-27 PROCEDURE — G8510 SCR DEP NEG, NO PLAN REQD: HCPCS | Performed by: INTERNAL MEDICINE

## 2021-10-27 PROCEDURE — G8754 DIAS BP LESS 90: HCPCS | Performed by: INTERNAL MEDICINE

## 2021-10-27 PROCEDURE — 1101F PT FALLS ASSESS-DOCD LE1/YR: CPT | Performed by: INTERNAL MEDICINE

## 2021-10-27 PROCEDURE — G8417 CALC BMI ABV UP PARAM F/U: HCPCS | Performed by: INTERNAL MEDICINE

## 2021-10-27 PROCEDURE — 99214 OFFICE O/P EST MOD 30 MIN: CPT | Performed by: INTERNAL MEDICINE

## 2021-10-27 PROCEDURE — G8536 NO DOC ELDER MAL SCRN: HCPCS | Performed by: INTERNAL MEDICINE

## 2021-10-27 NOTE — PATIENT INSTRUCTIONS
SPECIFIC INSTRUCTIONS BELOW        tapazole 5 mg and 2.5 mg alternately       Stay on  Vit D  1200 it units a day   CALCIUM 600 MG BID       -------------PAY ATTENTION TO THESE GENERAL INSTRUCTIONS -----------------      - The medications prescribed at this visit will not be available at pharmacy until 6 pm       - YOUR MED LIST IS NOT UP TO DATE AS SOME CHANGES ARE BEING MADE AFTER THE VISIT - FOLLOW SPECIFIC INSTRUCTIONS  ABOVE     -ANY tests other than blood work, which you opt to do  outside the  [facility] facilities, you are responsible for prior authorizations if  required    - [phone] [facility]- PLEASE IGNORE     Results     *Normal results will not be notified by a phone call starting January 1 2021   *If you have an upcoming visit, the results will be discussed at the visit   *Please sign up for MY CHART if you want access to your lab and test results  *Abnormal results which require immediate attention will be notified by phone call   *Abnormal results which do not require immediate assistance will be notified in 1-2 weeks       Refills    -    have your pharmacy send us a refill request . Refills are done max for one year and a visit is a must before refills are extended    Follow up appointments -  highly encourage you to make it when you are checking out. We can accommodate you into the schedule based on your clinical situation, but not for extending refills beyond a year. Labs are important to give refills and is important to get labs before the visit     Phone calls  -  Allow  24 hrs.  for non-urgent calls to be returned  Prior authorization - It may take 2-4 weeks to process  Forms  -  FMLA, DMV etc., will take up to 2 weeks to process  Cancellations - please notify the office 2 days in advance   Samples  - will only be dispensed at visits       If not showing for the appointments and cancelling appointments within 24 hours are kept track of and three  of such situations in  two consecutive years will likely be considered for termination from the practice    -------------------------------------------------------------------------------------------------------------------

## 2021-10-27 NOTE — LETTER
10/28/2021    Patient: Tello Fuentes   YOB: 1940   Date of Visit: 10/27/2021     Dom Sanches MD  [facility] [address]  Via Fax: [phone]    Dear Dom Sanches MD,      Thank you for referring Ms. Aniyah Walter to [address] [place] [address] for evaluation. My notes for this consultation are attached. If you have questions, please do not hesitate to call me. I look forward to following your patient along with you.       Sincerely,    Jaren Dumont MD

## 2021-10-27 NOTE — PROGRESS NOTES
HISTORY OF PRESENT ILLNESS       Laura Rowe is a 80  y.o. female. 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 after October 2020      Gained 2 lbs  Compliant with tapazole       october 2020     Gained 2  lbs   She has been doing well   No compressive symptoms     Old history   Gained 2 lbs   She is compliant with Tapazole 5 mg and 2.5 mg alternately    Denies any hyper or hypothyroid symptoms   Denies any growth of goiter eitherHas occasional swallowing issues    She had labs outside   No interim hospitalizations      Review of Systems   Constitutional: Negative. Psychiatric/Behavioral: Negative for depression and memory loss. The patient does not have insomnia. Physical Exam   Constitutional: She is oriented to person, place, and time. She appears well-developed and well-nourished. Neck: Normal range of motion. Neck supple. No JVD present. No tracheal deviation present. Thyromegaly (2-3 times enlarged nodular bilaterally) present. Psychiatric: She has a normal mood and affect. Lab Results   Component Value Date/Time    TSH 3.140 10/19/2021 01:26 PM    T4, Free 1.06 10/19/2021 01:26 PM          ASSESSMENT and PLAN       1. 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 TSH is 4.4 - pcp labs scanned from 4/14/2014    Started on  Tapazole  5 mg and 2.5 mg from April 2014   And she is euthyrodi now on this dose  Oct 2019 - doing well on the same dose     Oct 2020  -  TAPAZole   5 mg and 2.5   Alternate days, and this dose for   6  Years   Oct 2021 -  Euthyroid on this TAPAZOLE of 5 mg  And  2.5 mg  Alternate days for 7 years          2. Bilateral dominant nodules: 2.7 cm on right and on left side 1.6 cm from usg 4/29/2011 .    Bilateral biopsies were done in dec 2011 and were negative for cancer   Deleted notes   Oct 2020 :  APPOMATOX  IMAGING  OCT 2018  : left side 1.3 cm  And right side 2.1 cm ( noticing slight growth of nodule on right side )   Oct 2021 : appomatox  Imaging  :   Largest nodule  On right side  -  1.8 cm by 1.6 cm by 0.8 cm      3.  Low BMD :   Date : Bone DEXA 3/24/2011 ap spine T-score -1.2; left femoral Neck T- score -1.0, right femoral neck T-score-1.5   On bisphonates oral fosamax for 11 years   AND STOPPED 5 YEARS AGO 2015     She expressed concern for atypical fractures on fosamax   T 12 mild wedge deformity     Date : oct 2013   Bone DEXA  ap spine T-score -1.1; left femoral Neck T- score  -0.8, right femoral neck T-score -1.5  This one says that she did not have wedge fracture ( on hologic)  On premarin  Cream  for years since 2005  Date : dec 2015   Bone DEXA  ap spine T-score -1.7 ; left femoral Neck T- score  -1.1, right femoral neck T-score -1.2    She had dexas   2017 ,  2019  , 2021  - need to obtain     She defers meds   She is aware of  estrogen adverse effects , but she is on it thru vaginal use  Stay on calcium and vit d            Reviewed results with patient and discussed the labs being ordered today/bnv  Patient voiced understanding of plan of care

## 2022-01-05 ENCOUNTER — DOCUMENTATION ONLY (OUTPATIENT)
Dept: ENDOCRINOLOGY | Age: 82
End: 2022-01-05

## 2022-01-06 NOTE — PROGRESS NOTES
Date : dec 21 2021   Bone DEXA from [facility]   ap spine T-score  0 ; left femoral Neck T- score  n/a, right femoral neck T-score -1.4    Tania Narayan MD

## 2022-03-11 ENCOUNTER — TELEPHONE (OUTPATIENT)
Dept: OBGYN CLINIC | Age: 82
End: 2022-03-11

## 2022-03-11 DIAGNOSIS — Z46.89 ENCOUNTER FOR PESSARY MAINTENANCE: Primary | ICD-10-CM

## 2022-03-11 RX ORDER — ESTRADIOL 0.1 MG/G
CREAM VAGINAL
Qty: 30 G | Refills: 1 | Status: SHIPPED | OUTPATIENT
Start: 2022-03-11 | End: 2022-05-04 | Stop reason: SDUPTHER

## 2022-03-11 NOTE — TELEPHONE ENCOUNTER
Patient called requesting a refill on Estradiol vaginal cream.  She is scheduled for her annual exam on 05/04/22. Refill submitted.

## 2022-05-04 ENCOUNTER — OFFICE VISIT (OUTPATIENT)
Dept: OBGYN CLINIC | Age: 82
End: 2022-05-04
Payer: MEDICARE

## 2022-05-04 VITALS
RESPIRATION RATE: 16 BRPM | DIASTOLIC BLOOD PRESSURE: 66 MMHG | OXYGEN SATURATION: 98 % | SYSTOLIC BLOOD PRESSURE: 114 MMHG | WEIGHT: 174 LBS | HEIGHT: 63 IN | BODY MASS INDEX: 30.83 KG/M2 | HEART RATE: 71 BPM

## 2022-05-04 DIAGNOSIS — Z46.89 ENCOUNTER FOR PESSARY MAINTENANCE: ICD-10-CM

## 2022-05-04 DIAGNOSIS — Z90.710 SCREENING FOR MALIGNANT NEOPLASM OF VAGINA AFTER TOTAL HYSTERECTOMY: ICD-10-CM

## 2022-05-04 DIAGNOSIS — Z12.72 SCREENING FOR MALIGNANT NEOPLASM OF VAGINA AFTER TOTAL HYSTERECTOMY: ICD-10-CM

## 2022-05-04 DIAGNOSIS — Z01.419 GYNECOLOGIC EXAM NORMAL: Primary | ICD-10-CM

## 2022-05-04 PROCEDURE — G0101 CA SCREEN;PELVIC/BREAST EXAM: HCPCS | Performed by: OBSTETRICS & GYNECOLOGY

## 2022-05-04 PROCEDURE — G8417 CALC BMI ABV UP PARAM F/U: HCPCS | Performed by: OBSTETRICS & GYNECOLOGY

## 2022-05-04 PROCEDURE — G8536 NO DOC ELDER MAL SCRN: HCPCS | Performed by: OBSTETRICS & GYNECOLOGY

## 2022-05-04 PROCEDURE — G8427 DOCREV CUR MEDS BY ELIG CLIN: HCPCS | Performed by: OBSTETRICS & GYNECOLOGY

## 2022-05-04 PROCEDURE — G8510 SCR DEP NEG, NO PLAN REQD: HCPCS | Performed by: OBSTETRICS & GYNECOLOGY

## 2022-05-04 RX ORDER — ESTRADIOL 0.1 MG/G
CREAM VAGINAL
Qty: 30 G | Refills: 1 | Status: SHIPPED | OUTPATIENT
Start: 2022-05-04

## 2022-05-04 RX ORDER — ASCORBIC ACID 500 MG
1000 TABLET ORAL DAILY
COMMUNITY

## 2022-05-04 NOTE — PROGRESS NOTES
Kenton Wall is a [de-identified] y.o. female, , No LMP recorded. Patient is postmenopausal., who presents today for the following:  Chief Complaint   Patient presents with    Annual Exam        Allergies   Allergen Reactions    Latex, Natural Rubber Rash    Codeine Unknown (comments)    Peanut Hives and Itching     All nuts       Current Outpatient Medications   Medication Sig    ascorbic acid, vitamin C, (Vitamin C) 500 mg tablet Take 1,000 mg by mouth daily.  estradioL (ESTRACE) 0.01 % (0.1 mg/gram) vaginal cream Insert 1 gm daily by vaginal route    methIMAzole (TAPAZOLE) 5 mg tablet ALTERNATE 1 TABLET WITH 1/2TABLET DAILY    calcium carbonate (CALCIUM 600 PO) Take 600 mg by mouth two (2) times a day.  cholecalciferol, vitamin D3, (VITAMIN D3) 2,000 unit tab Take 2,000 Units by mouth daily.  spironolactone (ALDACTONE) 25 mg tablet Take  by mouth daily.  montelukast (SINGULAIR) 10 mg tablet Take 10 mg by mouth daily. No current facility-administered medications for this visit.        Past Medical History:   Diagnosis Date    Arthritis     Asthma     Hypertension     Hyperthyroidism     Osteoporosis     Thyroid disease     hyperthyroidism       Past Surgical History:   Procedure Laterality Date    HX BREAST BIOPSY      HX HYSTERECTOMY      HX OTHER SURGICAL  2019    right hand trigger release    HX REFRACTIVE SURGERY         Family History   Problem Relation Age of Onset    Cancer Other     Hypertension Other     Diabetes Father     Hypertension Father     Heart Disease Father     Hypertension Sister     Cancer Sister         colon/rectum    Cancer Brother     Alzheimer's Disease Paternal Aunt        Social History     Socioeconomic History    Marital status:      Spouse name: Not on file    Number of children: Not on file    Years of education: Not on file    Highest education level: Not on file   Occupational History    Not on file   Tobacco Use    Smoking status: Never Smoker    Smokeless tobacco: Never Used   Substance and Sexual Activity    Alcohol use: Yes     Comment: occas    Drug use: No    Sexual activity: Not Currently     Birth control/protection: Surgical   Other Topics Concern    Not on file   Social History Narrative    Not on file     Social Determinants of Health     Financial Resource Strain:     Difficulty of Paying Living Expenses: Not on file   Food Insecurity:     Worried About Running Out of Food in the Last Year: Not on file    Dev of Food in the Last Year: Not on file   Transportation Needs:     Lack of Transportation (Medical): Not on file    Lack of Transportation (Non-Medical): Not on file   Physical Activity:     Days of Exercise per Week: Not on file    Minutes of Exercise per Session: Not on file   Stress:     Feeling of Stress : Not on file   Social Connections:     Frequency of Communication with Friends and Family: Not on file    Frequency of Social Gatherings with Friends and Family: Not on file    Attends Jainism Services: Not on file    Active Member of [address] [place] [address] Platial or Organizations: Not on file    Attends Club or Organization Meetings: Not on file    Marital Status: Not on file   Intimate Partner Violence:     Fear of Current or Ex-Partner: Not on file    Emotionally Abused: Not on file    Physically Abused: Not on file    Sexually Abused: Not on file   Housing Stability:     Unable to Pay for Housing in the Last Year: Not on file    Number of [place] in the Last Year: Not on file    Unstable Housing in the Last Year: Not on file         HPI  Annual    Review of Systems   Constitutional: Negative. Respiratory: Negative. Cardiovascular: Negative. Gastrointestinal: Negative. Genitourinary: Negative. Musculoskeletal: Negative. Skin: Negative. Neurological: Negative. Endo/Heme/Allergies: Negative. Psychiatric/Behavioral: Negative. All other systems reviewed and are negative. /66 (BP 1 Location: Left upper arm, BP Patient Position: Sitting)   Pulse 71   Resp 16   Ht 5' 3\" (1.6 m)   Wt 174 lb (78.9 kg)   SpO2 98%   BMI 30.82 kg/m²    OBGyn Exam   Constitutional:     General Appearance: healthy-appearing, well-nourished, and well-developed; Level of Distress: NAD. Ambulation: ambulating normally. Psychiatric:   Insight: good judgement. Mental Status: normal mood and affect and active and alert. Orientation: to time, place, and person. Memory: recent memory normal and remote memory normal.     Head: Head: normocephalic and atraumatic. Neck:   Neck: supple, FROM, trachea midline, and no masses. Lymph Nodes: no cervical LAD, supraclavicular LAD, axillary LAD, or inguinal LAD. Thyroid: no enlargement or nodules and non-tender. Lungs:   Respiratory effort: no dyspnea. Cardiovascular:     Pulses including femoral / pedal: normal throughout. Breast: Breast: no masses or abnormal secretions and normal appearance. Abdomen:    no tenderness, guarding, masses, rebound tenderness, or CVA tenderness and non-distended. Female :   External genitalia: no lesions or rash and normal.   Vagina: moist mucosa; prolapse  Adnexae: no adnexal mass or tenderness and size WNL. Bladder and Urethra: normal bladder and urethra (except where noted). Skin:   Inspection and palpation: multiple nevi          1. Gynecologic exam normal    - PAP IG, RFX APTIMA HPV ASCUS ([id number])    2.  Screening for malignant neoplasm of vagina after total hysterectomy

## 2022-05-04 NOTE — PATIENT INSTRUCTIONS

## 2022-05-07 LAB
CYTOLOGIST CVX/VAG CYTO: NORMAL
CYTOLOGY CVX/VAG DOC CYTO: NORMAL
CYTOLOGY CVX/VAG DOC THIN PREP: NORMAL
DX ICD CODE: NORMAL
LABCORP, 190119: NORMAL
Lab: NORMAL
OTHER STN SPEC: NORMAL
STAT OF ADQ CVX/VAG CYTO-IMP: NORMAL

## 2022-09-29 ENCOUNTER — TRANSCRIBE ORDER (OUTPATIENT)
Dept: SCHEDULING | Age: 82
End: 2022-09-29

## 2022-09-29 DIAGNOSIS — Z12.31 SCREENING MAMMOGRAM FOR HIGH-RISK PATIENT: Primary | ICD-10-CM

## 2022-09-30 ENCOUNTER — HOSPITAL ENCOUNTER (OUTPATIENT)
Dept: MAMMOGRAPHY | Age: 82
Discharge: HOME | End: 2022-09-30
Payer: MEDICARE

## 2022-09-30 DIAGNOSIS — Z12.31 SCREENING MAMMOGRAM FOR HIGH-RISK PATIENT: ICD-10-CM

## 2022-09-30 PROCEDURE — 77063 BREAST TOMOSYNTHESIS BI: CPT

## 2022-10-26 LAB
BASOPHILS # BLD AUTO: 0 X10E3/UL (ref 0–0.2)
BASOPHILS NFR BLD AUTO: 0 %
EOSINOPHIL # BLD AUTO: 0.2 X10E3/UL (ref 0–0.4)
EOSINOPHIL NFR BLD AUTO: 2 %
ERYTHROCYTE [DISTWIDTH] IN BLOOD BY AUTOMATED COUNT: 12.6 % (ref 11.7–15.4)
HCT VFR BLD AUTO: 35.4 % (ref 34–46.6)
HGB BLD-MCNC: 12.2 G/DL (ref 11.1–15.9)
IMM GRANULOCYTES # BLD AUTO: 0 X10E3/UL (ref 0–0.1)
IMM GRANULOCYTES NFR BLD AUTO: 0 %
LYMPHOCYTES # BLD AUTO: 2.3 X10E3/UL (ref 0.7–3.1)
LYMPHOCYTES NFR BLD AUTO: 25 %
MCH RBC QN AUTO: 31.4 PG (ref 26.6–33)
MCHC RBC AUTO-ENTMCNC: 34.5 G/DL (ref 31.5–35.7)
MCV RBC AUTO: 91 FL (ref 79–97)
MONOCYTES # BLD AUTO: 0.8 X10E3/UL (ref 0.1–0.9)
MONOCYTES NFR BLD AUTO: 9 %
NEUTROPHILS # BLD AUTO: 5.6 X10E3/UL (ref 1.4–7)
NEUTROPHILS NFR BLD AUTO: 64 %
PLATELET # BLD AUTO: 311 X10E3/UL (ref 150–450)
RBC # BLD AUTO: 3.89 X10E6/UL (ref 3.77–5.28)
T4 FREE SERPL-MCNC: 1.07 NG/DL (ref 0.82–1.77)
TSH SERPL DL<=0.005 MIU/L-ACNC: 1.49 UIU/ML (ref 0.45–4.5)
WBC # BLD AUTO: 9 X10E3/UL (ref 3.4–10.8)

## 2022-10-27 ENCOUNTER — OFFICE VISIT (OUTPATIENT)
Dept: ENDOCRINOLOGY | Age: 82
End: 2022-10-27
Payer: MEDICARE

## 2022-10-27 ENCOUNTER — TELEPHONE (OUTPATIENT)
Dept: ENDOCRINOLOGY | Age: 82
End: 2022-10-27

## 2022-10-27 VITALS
DIASTOLIC BLOOD PRESSURE: 63 MMHG | HEART RATE: 78 BPM | BODY MASS INDEX: 29.95 KG/M2 | RESPIRATION RATE: 18 BRPM | SYSTOLIC BLOOD PRESSURE: 121 MMHG | TEMPERATURE: 97.2 F | OXYGEN SATURATION: 97 % | WEIGHT: 169 LBS | HEIGHT: 63 IN

## 2022-10-27 DIAGNOSIS — E05.90 SUBCLINICAL HYPERTHYROIDISM: ICD-10-CM

## 2022-10-27 DIAGNOSIS — M81.0 OSTEOPOROSIS, POST-MENOPAUSAL: ICD-10-CM

## 2022-10-27 DIAGNOSIS — E04.2 MULTINODULAR NON-TOXIC GOITER: ICD-10-CM

## 2022-10-27 DIAGNOSIS — E05.20 TOXIC NODULAR GOITER: Primary | ICD-10-CM

## 2022-10-27 PROCEDURE — G8417 CALC BMI ABV UP PARAM F/U: HCPCS | Performed by: INTERNAL MEDICINE

## 2022-10-27 PROCEDURE — G8752 SYS BP LESS 140: HCPCS | Performed by: INTERNAL MEDICINE

## 2022-10-27 PROCEDURE — G8754 DIAS BP LESS 90: HCPCS | Performed by: INTERNAL MEDICINE

## 2022-10-27 PROCEDURE — 3074F SYST BP LT 130 MM HG: CPT | Performed by: INTERNAL MEDICINE

## 2022-10-27 PROCEDURE — G8432 DEP SCR NOT DOC, RNG: HCPCS | Performed by: INTERNAL MEDICINE

## 2022-10-27 PROCEDURE — 3078F DIAST BP <80 MM HG: CPT | Performed by: INTERNAL MEDICINE

## 2022-10-27 PROCEDURE — G8427 DOCREV CUR MEDS BY ELIG CLIN: HCPCS | Performed by: INTERNAL MEDICINE

## 2022-10-27 PROCEDURE — G8536 NO DOC ELDER MAL SCRN: HCPCS | Performed by: INTERNAL MEDICINE

## 2022-10-27 PROCEDURE — 1123F ACP DISCUSS/DSCN MKR DOCD: CPT | Performed by: INTERNAL MEDICINE

## 2022-10-27 PROCEDURE — 1090F PRES/ABSN URINE INCON ASSESS: CPT | Performed by: INTERNAL MEDICINE

## 2022-10-27 PROCEDURE — 1101F PT FALLS ASSESS-DOCD LE1/YR: CPT | Performed by: INTERNAL MEDICINE

## 2022-10-27 PROCEDURE — 99214 OFFICE O/P EST MOD 30 MIN: CPT | Performed by: INTERNAL MEDICINE

## 2022-10-27 RX ORDER — METHIMAZOLE 5 MG/1
TABLET ORAL
Qty: 68 TABLET | Refills: 3 | Status: SHIPPED | OUTPATIENT
Start: 2022-10-27

## 2022-10-27 NOTE — LETTER
10/27/2022    Patient: Laurie Burn   YOB: 1940   Date of Visit: 10/27/2022     Shayla Barcenas NP  [address] [place] [address]  [address]  [place] [address]  Via Fax: [phone]    Dear Shayla Barcenas NP,      Thank you for referring Ms. Lino Arevalo to [address] [place] [address] for evaluation. My notes for this consultation are attached. If you have questions, please do not hesitate to call me. I look forward to following your patient along with you.       Sincerely,    Jazlyn Vargas MD

## 2022-10-27 NOTE — TELEPHONE ENCOUNTER
Called patient to advise her of Dr Romana Pam comments. Unable to leave voicemail message for return call as voicemail box is full.

## 2022-10-27 NOTE — TELEPHONE ENCOUNTER
Patient is requesting an order for a dexa scan to be mailed to her so she can have it sone at [facility] prior to next visit.

## 2022-10-27 NOTE — PATIENT INSTRUCTIONS
SPECIFIC INSTRUCTIONS BELOW        tapazole 5 mg and 2.5 mg alternately       Stay on  Vit D  1200 it units a day   CALCIUM 600 MG BID       -------------PAY ATTENTION TO THESE GENERAL INSTRUCTIONS -----------------      - The medications prescribed at this visit will not be available at pharmacy until 6 pm       - YOUR MED LIST IS NOT UP TO DATE AS SOME CHANGES ARE BEING MADE AFTER THE VISIT - FOLLOW SPECIFIC INSTRUCTIONS  ABOVE     -ANY tests other than blood work, which you opt to do  outside the  [facility] facilities, you are responsible for prior authorizations if  required    - [address] [facility]- PLEASE IGNORE     Results     *Normal results will not be notified by a phone call starting January 1 2021   *If you have an upcoming visit, the results will be discussed at the visit   *Please sign up for MY CHART if you want access to your lab and test results  *Abnormal results which require immediate attention will be notified by phone call   *Abnormal results which do not require immediate assistance will be notified in 1-2 weeks       Refills    -    have your pharmacy send us a refill request . Refills are done max for one year and a visit is a must before refills are extended    Follow up appointments -  highly encourage you to make it when you are checking out. We can accommodate you into the schedule based on your clinical situation, but not for extending refills beyond a year. Labs are important to give refills and is important to get labs before the visit     Phone calls  -  Allow  24 hrs.  for non-urgent calls to be returned  Prior authorization - It may take 2-4 weeks to process  Forms  -  FMLA, DMV etc., will take up to 2 weeks to process  Cancellations - please notify the office 2 days in advance   Samples  - will only be dispensed at visits       If not showing for the appointments and cancelling appointments within 24 hours are kept track of and three  of such situations in  two consecutive years will likely be considered for termination from the practice    -------------------------------------------------------------------------------------------------------------------

## 2022-10-27 NOTE — PROGRESS NOTES
HISTORY OF PRESENT ILLNESS       Davey Mayen is a [de-identified] y.o. female. 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 Psychiatric/Behavioral: Negative for depression and memory loss. The patient does not have insomnia. Physical Exam   Constitutional: She is oriented to person, place, and time. She appears well-developed and well-nourished. Neck: Normal range of motion. Neck supple. No JVD present. No tracheal deviation present. Thyromegaly (2-3 times enlarged nodular bilaterally) present. Psychiatric: She has a normal mood and affect. Lab Results   Component Value Date/Time    TSH 1.490 10/25/2022 10:32 AM    T4, Free 1.07 10/25/2022 10:32 AM          ASSESSMENT and PLAN       1. 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 TSH is 4.4 - pcp labs scanned from 4/14/2014    Started on  Tapazole  5 mg and 2.5 mg from April 2014   Oct 2022 -  Euthyroid on this TAPAZOLE of 5 mg  And  2.5 mg  Alternate days for 8  years          2. Bilateral dominant nodules: 2.7 cm on right and on left side 1.6 cm from usg 4/29/2011 .    Bilateral biopsies were done in dec 2011 and were negative for cancer   Deleted notes   Oct 2020 :  APPOMATOX  IMAGING  OCT 2018  : left side 1.3 cm  And right side 2.1 cm ( noticing slight growth of nodule on right side )   Oct 2021 : [facility]  Imaging  :   Largest nodule  On right side  -  1.8 cm by 1.6 cm by 0.8 cm      October 2022 : will do usg thyroid in 2023, but will be done with dexa due in dec 2023, so ordering it next visit           3.  Low BMD :   Date : Bone DEXA 3/24/2011 ap spine T-score -1.2; left femoral Neck T- score -1.0, right femoral neck T-score-1.5   On bisphonates oral fosamax for 11 years   AND STOPPED 5 YEARS AGO 2015     She expressed concern for atypical fractures on fosamax   T 12 mild wedge deformity     Date : oct 2013   Bone DEXA  ap spine T-score -1.1; left femoral Neck T- score  -0.8, right femoral neck T-score -1.5  This one says that she did not have wedge fracture ( on hologic)  On premarin  Cream  for years since 2005  Date : dec 2015   Bone DEXA  ap spine T-score -1.7 ; left femoral Neck T- score  -1.1, right femoral neck T-score -1.2    She had dexas   2017 ,  2019  , 2021      Date : dec 21 2021   Bone DEXA from [facility]   ap spine T-score  0 ; left femoral Neck T- score  n/a, right femoral neck T-score -1.4      She defers meds , due for  dexa in July 2023  Stay on calcium and vit d            Due for dexa  and usg together in dec 2023    Reviewed results with patient and discussed the labs being ordered today/bnv  Patient voiced understanding of plan of care

## 2022-10-27 NOTE — TELEPHONE ENCOUNTER
She will NOT be due for it then - she is due in dec 2023   I will do at next visit with me in a year     Princess Daniella MD

## 2023-05-18 RX ORDER — MONTELUKAST SODIUM 10 MG/1
10 TABLET ORAL DAILY
COMMUNITY

## 2023-05-18 RX ORDER — METHIMAZOLE 5 MG/1
TABLET ORAL
COMMUNITY
Start: 2022-10-27

## 2023-05-18 RX ORDER — SPIRONOLACTONE 25 MG/1
25 TABLET ORAL DAILY
COMMUNITY

## 2023-05-18 RX ORDER — ASCORBIC ACID 500 MG
1000 TABLET ORAL DAILY
COMMUNITY

## 2023-05-18 RX ORDER — ESTRADIOL 0.1 MG/G
CREAM VAGINAL
COMMUNITY
Start: 2022-05-04

## 2023-08-31 ENCOUNTER — TELEPHONE (OUTPATIENT)
Age: 83
End: 2023-08-31

## 2023-08-31 NOTE — TELEPHONE ENCOUNTER
1st patient is on the mediblue medicare but has ppo I have shared to call insurance to discuss out of network benefits. She understands and will let us know but is hoping to continue here as she really likes Dr Roseann Mcdowell.     In mean time she does have some recent concern for her nodule on the left side has become pretty sore/sensitive when lying down and would like to know if a pain reliever such as tylenol can help or should she be seen ? She had discussed a bad reaction to Rolaids after having a bad acid reflux flare up so unclear if she is associating the recent change in throat area of nodule to that.      Advised may be tomorrow before hearing back she understood

## 2023-09-06 NOTE — TELEPHONE ENCOUNTER
I spoke with patient, she is feeling much better. Did not take any pain medication. She did drink warm water and used ice pack. She is currently taking Omeprazole so she does not need to take Prilosec she says.    She will advise office if any further pain and will keep her appt in October 2024

## 2023-10-10 RX ORDER — METHIMAZOLE 5 MG/1
TABLET ORAL
Qty: 68 TABLET | Refills: 3 | Status: SHIPPED | OUTPATIENT
Start: 2023-10-10

## 2023-10-25 LAB
ALBUMIN SERPL-MCNC: 4.3 G/DL (ref 3.7–4.7)
ALBUMIN/GLOB SERPL: 1.3 {RATIO} (ref 1.2–2.2)
ALP SERPL-CCNC: 54 IU/L (ref 44–121)
ALT SERPL-CCNC: 12 IU/L (ref 0–32)
AST SERPL-CCNC: 14 IU/L (ref 0–40)
BILIRUB SERPL-MCNC: 0.3 MG/DL (ref 0–1.2)
BUN SERPL-MCNC: 14 MG/DL (ref 8–27)
BUN/CREAT SERPL: 16 (ref 12–28)
CALCIUM SERPL-MCNC: 10 MG/DL (ref 8.7–10.3)
CHLORIDE SERPL-SCNC: 104 MMOL/L (ref 96–106)
CO2 SERPL-SCNC: 24 MMOL/L (ref 20–29)
CREAT SERPL-MCNC: 0.9 MG/DL (ref 0.57–1)
EGFRCR SERPLBLD CKD-EPI 2021: 64 ML/MIN/1.73
GLOBULIN SER CALC-MCNC: 3.2 G/DL (ref 1.5–4.5)
GLUCOSE SERPL-MCNC: 96 MG/DL (ref 70–99)
POTASSIUM SERPL-SCNC: 4.3 MMOL/L (ref 3.5–5.2)
PROT SERPL-MCNC: 7.5 G/DL (ref 6–8.5)
SODIUM SERPL-SCNC: 143 MMOL/L (ref 134–144)
T4 FREE SERPL-MCNC: 1.04 NG/DL (ref 0.82–1.77)
TSH SERPL DL<=0.005 MIU/L-ACNC: 2.79 UIU/ML (ref 0.45–4.5)

## 2023-10-30 ENCOUNTER — OFFICE VISIT (OUTPATIENT)
Age: 83
End: 2023-10-30
Payer: COMMERCIAL

## 2023-10-30 VITALS
RESPIRATION RATE: 20 BRPM | DIASTOLIC BLOOD PRESSURE: 79 MMHG | BODY MASS INDEX: 28.67 KG/M2 | WEIGHT: 161.8 LBS | HEIGHT: 63 IN | HEART RATE: 76 BPM | OXYGEN SATURATION: 97 % | SYSTOLIC BLOOD PRESSURE: 139 MMHG

## 2023-10-30 DIAGNOSIS — E05.20 THYROTOXICOSIS WITH TOXIC MULTINODULAR GOITER WITHOUT THYROTOXIC CRISIS OR STORM: Primary | ICD-10-CM

## 2023-10-30 DIAGNOSIS — M81.0 AGE-RELATED OSTEOPOROSIS WITHOUT CURRENT PATHOLOGICAL FRACTURE: ICD-10-CM

## 2023-10-30 PROCEDURE — 3078F DIAST BP <80 MM HG: CPT | Performed by: INTERNAL MEDICINE

## 2023-10-30 PROCEDURE — 3075F SYST BP GE 130 - 139MM HG: CPT | Performed by: INTERNAL MEDICINE

## 2023-10-30 PROCEDURE — 1123F ACP DISCUSS/DSCN MKR DOCD: CPT | Performed by: INTERNAL MEDICINE

## 2023-10-30 PROCEDURE — 99214 OFFICE O/P EST MOD 30 MIN: CPT | Performed by: INTERNAL MEDICINE

## 2023-10-30 RX ORDER — METHIMAZOLE 5 MG/1
TABLET ORAL
Qty: 68 TABLET | Refills: 3 | Status: SHIPPED | OUTPATIENT
Start: 2023-10-30

## 2023-10-30 RX ORDER — OMEPRAZOLE 20 MG/1
CAPSULE, DELAYED RELEASE ORAL
COMMUNITY
Start: 2023-09-01

## 2023-10-30 RX ORDER — METHIMAZOLE 5 MG/1
TABLET ORAL
Qty: 68 TABLET | Refills: 3 | Status: SHIPPED | OUTPATIENT
Start: 2023-10-30 | End: 2023-10-30 | Stop reason: SDUPTHER

## 2023-10-30 NOTE — PROGRESS NOTES
Blood pressure 139/79, pulse 76, resp. rate 20, height 1.6 m (5' 3\"), weight 73.4 kg (161 lb 12.8 oz), SpO2 97 %.
right side )   Oct 2021 : [facility]  Imaging  :   Largest nodule  On right side  -  1.8 cm by 1.6 cm by 0.8 cm      October 2023  : will do usg thyroid in 2023, but will be done with dexa due in dec 2023, ordered           3.  Low BMD :   Date : Bone DEXA 3/24/2011 ap spine T-score -1.2; left femoral Neck T- score -1.0, right femoral neck T-score-1.5   On bisphonates oral fosamax for 11 years   AND STOPPED 5 YEARS AGO 2015     She expressed concern for atypical fractures on fosamax   T 12 mild wedge deformity     Date : oct 2013   Bone DEXA  ap spine T-score -1.1; left femoral Neck T- score  -0.8, right femoral neck T-score -1.5  This one says that she did not have wedge fracture ( on hologic)  On premarin  Cream  for years since 2005  Date : dec 2015   Bone DEXA  ap spine T-score -1.7 ; left femoral Neck T- score  -1.1, right femoral neck T-score -1.2    She had dexas   2017 ,  2019  , 2021      Date : dec 21 2021   Bone DEXA from [facility]   ap spine T-score  0 ; left femoral Neck T- score  n/a, right femoral neck T-score -1.4    Due for DEXA in  Dec 2023     She defers meds , due for  dexa in July 2023  Stay on calcium and vit d         Due for dexa  and usg together in dec 2023 - ordered     I am planning to see her after tests to discuss them with her     Reviewed results with patient and discussed the labs being ordered today/bnv  Patient voiced understanding of plan of care

## 2023-10-30 NOTE — PATIENT INSTRUCTIONS
SPECIFIC INSTRUCTIONS BELOW     TAPAZOLE of 5 mg  And  2.5 mg        -------------PAY ATTENTION TO THESE GENERAL INSTRUCTIONS -----------------      - The medications prescribed at this visit will not be available at pharmacy until 6 pm       - YOUR MED LIST IS NOT UP TO DATE AS SOME CHANGES ARE BEING MADE AFTER THE VISIT - FOLLOW SPECIFIC INSTRUCTIONS  ABOVE     -ANY tests other than blood work, which you opt to do  outside the  [facility] imaging facilities, you are responsible for prior authorizations if  required    - [address] [place] [address] Drive AVS- PLEASE IGNORE     Results     *Normal results will not be notified by a phone call starting January 1 2021   *If you have an upcoming visit, the results will be discussed at the visit   *Please sign up for MY CHART if you want access to your lab and test results  *Abnormal results which require immediate attention will be notified by phone call   *Abnormal results which do not require immediate assistance will be notified in 1-2 weeks       Refills    -    have your pharmacy send us a refill request . Refills are done max for one year and a visit is a must before refills are extended    Follow up appointments -  highly encourage you to make it when you are checking out. We can accommodate you into the schedule based on your clinical situation, but not for extending refills beyond a year. Labs are important to give refills and is important to get labs before the visit     Phone calls  -  Allow  24 hrs.  for non-urgent calls to be returned  Prior authorization - It may take 2-4 weeks to process  Forms  -  FMLA, DMV etc., will take up to 2 weeks to process  Cancellations - please notify the office 2 days in advance   Samples  - will only be dispensed at visits       If not showing for the appointments and cancelling appointments within 24 hours are kept track of and three  of such situations in  two consecutive years will likely be

## 2024-01-08 DIAGNOSIS — M81.0 AGE-RELATED OSTEOPOROSIS WITHOUT CURRENT PATHOLOGICAL FRACTURE: ICD-10-CM

## 2024-01-08 DIAGNOSIS — E05.20 THYROTOXICOSIS WITH TOXIC MULTINODULAR GOITER WITHOUT THYROTOXIC CRISIS OR STORM: ICD-10-CM

## 2024-01-08 RX ORDER — METHIMAZOLE 5 MG/1
TABLET ORAL
Qty: 70 TABLET | Refills: 3 | Status: SHIPPED | OUTPATIENT
Start: 2024-01-08 | End: 2024-01-11 | Stop reason: SDUPTHER

## 2024-01-11 DIAGNOSIS — E05.20 THYROTOXICOSIS WITH TOXIC MULTINODULAR GOITER WITHOUT THYROTOXIC CRISIS OR STORM: ICD-10-CM

## 2024-01-11 DIAGNOSIS — M81.0 AGE-RELATED OSTEOPOROSIS WITHOUT CURRENT PATHOLOGICAL FRACTURE: ICD-10-CM

## 2024-01-11 RX ORDER — METHIMAZOLE 5 MG/1
TABLET ORAL
Qty: 70 TABLET | Refills: 3 | Status: SHIPPED | OUTPATIENT
Start: 2024-01-11

## 2024-01-11 NOTE — TELEPHONE ENCOUNTER
Please submit a new script methimazole 5 mg to patient mail order, she did  the one from her local pharmacy that we sent, because she did not want to run out. But please correct. Then let her know when done. Thank you

## 2024-01-15 ENCOUNTER — HOSPITAL ENCOUNTER (OUTPATIENT)
Facility: HOSPITAL | Age: 84
Discharge: HOME | End: 2024-01-17
Payer: COMMERCIAL

## 2024-01-15 DIAGNOSIS — R25.2 MUSCLE CRAMPS: ICD-10-CM

## 2024-01-15 PROCEDURE — 93970 EXTREMITY STUDY: CPT

## 2024-02-05 ENCOUNTER — TELEPHONE (OUTPATIENT)
Age: 84
End: 2024-02-05

## 2024-02-06 NOTE — TELEPHONE ENCOUNTER
Reviewed  the  usg thyroid  from  [facility]  dated  jan 18 2024       Multiple thyroid nodules   Right lobe thyroid nodule is TR 4  2 cm by 0.8 cm 1.1 cm  but similar in size  to  one  from October 11 2021     There is a mixed echogenic nodule  of  1.4 cm by 1.2 cm by 0.7 cm .      Lower pole nodule of 1.2 cm     There is a isoechoic nodule  on left thyroid isthmus  of  1.6 cm by 0.8 by 1.7 cm  TR 3 .    Impression : to follow up in another year per radiology       Misty Garrison MD

## 2024-02-26 ENCOUNTER — TELEPHONE (OUTPATIENT)
Age: 84
End: 2024-02-26

## 2024-02-26 NOTE — TELEPHONE ENCOUNTER
Reviewed the DEXa  from  [facility]   Jan 18 2024       Date :   Bone DEXA  ap spine T-score -0.2 ; left femoral Neck T- score  -1.6, right femoral neck T-score-1.6       BMD decreased by  1.5 %  at spine  - not clinically significant   And Fem neck density decreased by 7 %      Misty Garrison MD

## 2024-04-29 ENCOUNTER — OFFICE VISIT (OUTPATIENT)
Age: 84
End: 2024-04-29
Payer: COMMERCIAL

## 2024-04-29 VITALS
HEIGHT: 63 IN | TEMPERATURE: 97.6 F | OXYGEN SATURATION: 98 % | HEART RATE: 76 BPM | DIASTOLIC BLOOD PRESSURE: 73 MMHG | SYSTOLIC BLOOD PRESSURE: 135 MMHG | WEIGHT: 160.8 LBS | BODY MASS INDEX: 28.49 KG/M2

## 2024-04-29 DIAGNOSIS — E05.20 THYROTOXICOSIS WITH TOXIC MULTINODULAR GOITER WITHOUT THYROTOXIC CRISIS OR STORM: Primary | ICD-10-CM

## 2024-04-29 DIAGNOSIS — M81.0 AGE-RELATED OSTEOPOROSIS WITHOUT CURRENT PATHOLOGICAL FRACTURE: ICD-10-CM

## 2024-04-29 PROCEDURE — 1123F ACP DISCUSS/DSCN MKR DOCD: CPT | Performed by: INTERNAL MEDICINE

## 2024-04-29 PROCEDURE — 3078F DIAST BP <80 MM HG: CPT | Performed by: INTERNAL MEDICINE

## 2024-04-29 PROCEDURE — 99214 OFFICE O/P EST MOD 30 MIN: CPT | Performed by: INTERNAL MEDICINE

## 2024-04-29 PROCEDURE — 3075F SYST BP GE 130 - 139MM HG: CPT | Performed by: INTERNAL MEDICINE

## 2024-04-29 NOTE — PROGRESS NOTES
[facility] DIABETES AND ENDOCRINOLOGY              Misty Garrison MD FACE       HISTORY OF PRESENT ILLNESS       Marianna Ortiz is a 84    y.o. female.   HPI   6 monthly    F/u for toxic MNG and osteoporosis after October 2023    Doing well   Taking tapazole as advised     She had dexa  and usg as advised and is here to discuss results           October 2022     Has left meniscal tear , better now than a month ago   Takes tapazole 5 mg and 2.5 mg alternately       October 2021     Gained 2 lbs  Compliant with tapazole       Old history   Gained 2 lbs   She is compliant with Tapazole 5 mg and 2.5 mg alternately    Denies any hyper or hypothyroid symptoms   Denies any growth of goiter eitherHas occasional swallowing issues    She had labs outside   No interim hospitalizations      Review of Systems   Constitutional: Negative.   Psychiatric/Behavioral: Negative for depression and memory loss. The patient does not have insomnia.         Physical Exam   Constitutional: She is oriented to person, place, and time. She appears well-developed and well-nourished.   Neck: Normal range of motion. Neck supple. No JVD present. No tracheal deviation present. Thyromegaly (2-3 times enlarged nodular bilaterally) present.   Psychiatric: She has a normal mood and affect.            Labs  Reviewed from pcp  jan 2024          ASSESSMENT and PLAN       1. 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   TSH is 4.4 - [facility] labs scanned from 4/14/2014    Started on  Tapazole  5 mg and 2.5 mg from April 2014 April 2024  -  Euthyroid on this TAPAZOLE of 5 mg  And  2.5 mg  Alternate days for 8  years            2. Bilateral dominant nodules: 2.7 cm on right and on left side 1.6 cm from usg 4/29/2011 .   Bilateral biopsies were done in dec 2011 and were negative for cancer   Deleted notes   Oct 2020 :  APPOMATOX  IMAGING  OCT 2018  : left side 1.3 cm  And right side 2.1 cm ( noticing slight growth of nodule on

## 2024-04-29 NOTE — PATIENT INSTRUCTIONS
SPECIFIC INSTRUCTIONS BELOW     TAPAZOLE of 5 mg  And  2.5 mg alternate days         -------------PAY ATTENTION TO THESE GENERAL INSTRUCTIONS -----------------      - The medications prescribed at this visit will not be available at pharmacy until 6 pm       - YOUR MED LIST IS NOT UP TO DATE AS SOME CHANGES ARE BEING MADE AFTER THE VISIT - FOLLOW SPECIFIC INSTRUCTIONS  ABOVE     -ANY tests other than blood work, which you opt to do  outside the  [facility] facilities, you are responsible for prior authorizations if  required    - HEALTH MAINTENANCE IS NOT GOING TO BE UP TO DATE ON YOUR AVS- PLEASE IGNORE     Results     *Normal results will not be notified by a phone call starting January 1 2021   *If you have an upcoming visit, the results will be discussed at the visit   *Please sign up for MY CHART if you want access to your lab and test results  *Abnormal results which require immediate attention will be notified by phone call   *Abnormal results which do not require immediate assistance will be notified in 1-2 weeks       Refills    -    have your pharmacy send us a refill request . Refills are done max for one year and a visit is a must before refills are extended    Follow up appointments -  highly encourage you to make it when you are checking out. We can accommodate you into the schedule based on your clinical situation, but not for extending refills beyond a year. Labs are important to give refills and is important to get labs before the visit     Phone calls  -  Allow  24 hrs. for non-urgent calls to be returned  Prior authorization - It may take 2-4 weeks to process  Forms  -  FMLA, DMV etc., will take up to 2 weeks to process  Cancellations - please notify the office 2 days in advance   Samples  - will only be dispensed at visits       If not showing for the appointments and cancelling appointments within 24 hours are kept track of and three  of such situations in  two consecutive years will

## 2024-08-01 DIAGNOSIS — E05.20 THYROTOXICOSIS WITH TOXIC MULTINODULAR GOITER WITHOUT THYROTOXIC CRISIS OR STORM: ICD-10-CM

## 2024-08-01 DIAGNOSIS — M81.0 AGE-RELATED OSTEOPOROSIS WITHOUT CURRENT PATHOLOGICAL FRACTURE: ICD-10-CM

## 2025-01-17 DIAGNOSIS — M81.0 AGE-RELATED OSTEOPOROSIS WITHOUT CURRENT PATHOLOGICAL FRACTURE: ICD-10-CM

## 2025-01-17 DIAGNOSIS — E05.20 THYROTOXICOSIS WITH TOXIC MULTINODULAR GOITER WITHOUT THYROTOXIC CRISIS OR STORM: ICD-10-CM

## 2025-01-19 RX ORDER — METHIMAZOLE 5 MG/1
TABLET ORAL
Qty: 70 TABLET | Refills: 3 | Status: SHIPPED | OUTPATIENT
Start: 2025-01-19

## 2025-03-03 NOTE — TELEPHONE ENCOUNTER
Done     Misty Garrison MD    
Patient states methimazole was sent to local pharmacy she must fill through mail order [facility] please resend to mail order  
[place]

## 2025-04-11 LAB
25(OH)D3+25(OH)D2 SERPL-MCNC: 66.7 NG/ML (ref 30–100)
ALBUMIN SERPL-MCNC: 4.4 G/DL (ref 3.7–4.7)
ALP SERPL-CCNC: 59 IU/L (ref 44–121)
ALT SERPL-CCNC: 15 IU/L (ref 0–32)
AST SERPL-CCNC: 19 IU/L (ref 0–40)
BILIRUB SERPL-MCNC: 0.7 MG/DL (ref 0–1.2)
BUN SERPL-MCNC: 17 MG/DL (ref 8–27)
BUN/CREAT SERPL: 20 (ref 12–28)
CALCIUM SERPL-MCNC: 10 MG/DL (ref 8.7–10.3)
CHLORIDE SERPL-SCNC: 103 MMOL/L (ref 96–106)
CO2 SERPL-SCNC: 23 MMOL/L (ref 20–29)
CREAT SERPL-MCNC: 0.86 MG/DL (ref 0.57–1)
EGFRCR SERPLBLD CKD-EPI 2021: 67 ML/MIN/1.73
GLOBULIN SER CALC-MCNC: 3 G/DL (ref 1.5–4.5)
GLUCOSE SERPL-MCNC: 88 MG/DL (ref 70–99)
POTASSIUM SERPL-SCNC: 4.3 MMOL/L (ref 3.5–5.2)
PROT SERPL-MCNC: 7.4 G/DL (ref 6–8.5)
SODIUM SERPL-SCNC: 142 MMOL/L (ref 134–144)
TSH SERPL DL<=0.005 MIU/L-ACNC: 0.52 UIU/ML (ref 0.45–4.5)

## 2025-04-28 ENCOUNTER — OFFICE VISIT (OUTPATIENT)
Age: 85
End: 2025-04-28
Payer: MEDICARE

## 2025-04-28 VITALS
SYSTOLIC BLOOD PRESSURE: 121 MMHG | HEART RATE: 85 BPM | HEIGHT: 63 IN | OXYGEN SATURATION: 98 % | DIASTOLIC BLOOD PRESSURE: 73 MMHG | WEIGHT: 164.2 LBS | TEMPERATURE: 97.5 F | BODY MASS INDEX: 29.09 KG/M2

## 2025-04-28 DIAGNOSIS — E05.20 THYROTOXICOSIS WITH TOXIC MULTINODULAR GOITER WITHOUT THYROTOXIC CRISIS OR STORM: Primary | ICD-10-CM

## 2025-04-28 DIAGNOSIS — M81.0 AGE-RELATED OSTEOPOROSIS WITHOUT CURRENT PATHOLOGICAL FRACTURE: ICD-10-CM

## 2025-04-28 DIAGNOSIS — E55.9 VITAMIN D DEFICIENCY: ICD-10-CM

## 2025-04-28 PROCEDURE — 3074F SYST BP LT 130 MM HG: CPT | Performed by: INTERNAL MEDICINE

## 2025-04-28 PROCEDURE — 1126F AMNT PAIN NOTED NONE PRSNT: CPT | Performed by: INTERNAL MEDICINE

## 2025-04-28 PROCEDURE — 1159F MED LIST DOCD IN RCRD: CPT | Performed by: INTERNAL MEDICINE

## 2025-04-28 PROCEDURE — 3078F DIAST BP <80 MM HG: CPT | Performed by: INTERNAL MEDICINE

## 2025-04-28 PROCEDURE — 1123F ACP DISCUSS/DSCN MKR DOCD: CPT | Performed by: INTERNAL MEDICINE

## 2025-04-28 PROCEDURE — 99214 OFFICE O/P EST MOD 30 MIN: CPT | Performed by: INTERNAL MEDICINE

## 2025-04-28 PROCEDURE — 1160F RVW MEDS BY RX/DR IN RCRD: CPT | Performed by: INTERNAL MEDICINE

## 2025-04-28 NOTE — PROGRESS NOTES
Marianna Ortiz is a 85 y.o. female here for   Chief Complaint   Patient presents with    Osteoporosis    MNG       1. Have you been to the ER, urgent care clinic since your last visit?  Hospitalized since your last visit? -No    2. Have you seen or consulted any other health care providers outside of the [facility] since your last visit?  Include any pap smears or colon screening.-No

## 2025-04-28 NOTE — PATIENT INSTRUCTIONS
will likely be considered for termination from the practice    -------------------------------------------------------------------------------------------------------------------

## 2025-04-28 NOTE — PROGRESS NOTES
[facility] DIABETES AND ENDOCRINOLOGY              Misty Garrison MD FACE       HISTORY OF PRESENT ILLNESS       Marianna Ortiz is a 85     y.o. female.   HPI   12   monthly    F/u for toxic MNG and osteoporosis after April 2024    Pt generally healthy, diagnosed to have A1c  5.7 %     She has left hip pain ,  radiating to back   She  made an appt with ortho  tomorrow        April 2024    Doing well   Taking tapazole as advised   She had dexa  and usg as advised and is here to discuss results       October 2022   Has left meniscal tear , better now than a month ago   Takes tapazole 5 mg and 2.5 mg alternately       October 2021     Gained 2 lbs  Compliant with tapazole       Old history   Gained 2 lbs   She is compliant with Tapazole 5 mg and 2.5 mg alternately    Denies any hyper or hypothyroid symptoms   Denies any growth of goiter eitherHas occasional swallowing issues    She had labs outside   No interim hospitalizations      Review of Systems   Left hip pain  and  right shin pain radiating to ankle         Physical Exam   Constitutional: She is oriented to person, place, and time. She appears well-developed and well-nourished.   Neck: Normal range of motion. Neck supple. No JVD present. No tracheal deviation present. Thyromegaly (2-3 times enlarged nodular bilaterally) present.   Psychiatric: She has a normal mood and affect.            Labs  Lab Results   Component Value Date    TSH 0.520 04/10/2025    T4FREE 1.04 10/24/2023                ASSESSMENT and PLAN       1. 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   TSH is 4.4 - pcp labs scanned from 4/14/2014    Started on  Tapazole  5 mg and 2.5 mg from April 2014 April 2025   -  Euthyroid on this TAPAZOLE of 5 mg  And  2.5 mg  Alternate days for 9  years            2. Bilateral dominant nodules: 2.7 cm on right and on left side 1.6 cm from usg 4/29/2011 .   Bilateral biopsies were done in dec 2011 and were negative for

## 2025-07-02 ENCOUNTER — TRANSCRIBE ORDERS (OUTPATIENT)
Facility: HOSPITAL | Age: 85
End: 2025-07-02

## 2025-07-02 DIAGNOSIS — Z12.31 ENCOUNTER FOR SCREENING MAMMOGRAM FOR MALIGNANT NEOPLASM OF BREAST: Primary | ICD-10-CM

## 2025-07-07 ENCOUNTER — HOSPITAL ENCOUNTER (OUTPATIENT)
Facility: HOSPITAL | Age: 85
Discharge: HOME | End: 2025-07-10
Payer: MEDICARE

## 2025-07-07 DIAGNOSIS — Z12.31 ENCOUNTER FOR SCREENING MAMMOGRAM FOR MALIGNANT NEOPLASM OF BREAST: ICD-10-CM

## 2025-07-07 PROCEDURE — 77063 BREAST TOMOSYNTHESIS BI: CPT
